# Patient Record
Sex: FEMALE | Race: WHITE | NOT HISPANIC OR LATINO | Employment: UNEMPLOYED | URBAN - METROPOLITAN AREA
[De-identification: names, ages, dates, MRNs, and addresses within clinical notes are randomized per-mention and may not be internally consistent; named-entity substitution may affect disease eponyms.]

---

## 2023-03-09 LAB
APPEARANCE, URINE: CLEAR
BACTERIA, URINE: ABNORMAL /HPF
BILIRUBIN, URINE: NEGATIVE
BLOOD, URINE: ABNORMAL
COLOR, URINE: ABNORMAL
GLUCOSE, URINE: NEGATIVE MG/DL
KETONES, URINE: ABNORMAL MG/DL
LEUKOCYTE ESTERASE, URINE: ABNORMAL
NITRITE, URINE: NEGATIVE
PH, URINE: 7 (ref 5–8)
PROTEIN, URINE: NEGATIVE MG/DL
RBC, URINE: <1 /HPF (ref 0–5)
SPECIFIC GRAVITY, URINE: 1.01 (ref 1–1.03)
SQUAMOUS EPITHELIAL CELLS, URINE: 3 /HPF
UROBILINOGEN, URINE: <2 MG/DL (ref 0–1.9)
WBC, URINE: 1 /HPF (ref 0–5)

## 2023-03-11 LAB — URINE CULTURE: NORMAL

## 2023-05-30 LAB
APPEARANCE, URINE: CLEAR
BILIRUBIN, URINE: NEGATIVE
BLOOD, URINE: NEGATIVE
COLOR, URINE: COLORLESS
GLUCOSE, URINE: NEGATIVE MG/DL
KETONES, URINE: NEGATIVE MG/DL
LEUKOCYTE ESTERASE, URINE: NEGATIVE
NITRITE, URINE: NEGATIVE
PH, URINE: 6 (ref 5–8)
PROTEIN, URINE: NEGATIVE MG/DL
SPECIFIC GRAVITY, URINE: 1.01 (ref 1–1.03)
UROBILINOGEN, URINE: <2 MG/DL (ref 0–1.9)

## 2023-05-31 LAB — URINE CULTURE: NORMAL

## 2023-11-03 ENCOUNTER — TELEMEDICINE (OUTPATIENT)
Dept: PRIMARY CARE | Facility: CLINIC | Age: 48
End: 2023-11-03
Payer: COMMERCIAL

## 2023-11-03 DIAGNOSIS — U07.1 COVID-19: Primary | ICD-10-CM

## 2023-11-03 PROCEDURE — 99213 OFFICE O/P EST LOW 20 MIN: CPT | Performed by: INTERNAL MEDICINE

## 2023-11-03 RX ORDER — NIRMATRELVIR AND RITONAVIR 300-100 MG
3 KIT ORAL 2 TIMES DAILY
Qty: 30 TABLET | Refills: 0 | Status: SHIPPED | OUTPATIENT
Start: 2023-11-03 | End: 2023-11-08

## 2023-11-03 ASSESSMENT — ENCOUNTER SYMPTOMS
FEVER: 1
SORE THROAT: 1
COUGH: 1
HEADACHES: 1

## 2023-11-03 NOTE — PROGRESS NOTES
Subjective   Patient ID: Orly Hernández is a 47 y.o. female who presents for No chief complaint on file..    Fever   This is a new problem. The current episode started yesterday. The problem occurs constantly. The problem has been rapidly worsening. The maximum temperature noted was 103 to 103.9 F. The temperature was taken using a tympanic thermometer. Associated symptoms include congestion, coughing, headaches, muscle aches and a sore throat.     Patient on with me on a virtual visit  Monday had a cold sore and heavy discharge of mucus, fever 103  Took flu stuff with ibuprofen   Covid +   Review of Systems   Constitutional:  Positive for fever.   HENT:  Positive for congestion and sore throat.    Respiratory:  Positive for cough.    Neurological:  Positive for headaches.     General: see hpi  Ears, Nose, Throat : see hpi  Dermatologic: Negative for new skin conditions, rash  Respiratory: see hpi  Cardiovascular: Negative for chest pain, palpitations, or leg swelling  Gastrointestinal: Negative for nausea/vomiting, abdominal pain, changes in bowel habits  Neurological: Negative for dizziness see hpi headaches    Previous history  Past Medical History:   Diagnosis Date    Abnormal weight gain 08/15/2022    Weight gain    Encounter for gynecological examination (general) (routine) without abnormal findings 08/31/2022    Visit for gynecologic examination    Encounter for other screening for malignant neoplasm of breast 08/16/2022    Breast screening    Encounter for screening for malignant neoplasm of colon 08/16/2022    Colon cancer screening    Hyperlipidemia, unspecified 08/16/2022    Hyperlipidemia    Nonscarring hair loss, unspecified 07/08/2022    Alopecia    Pain in right knee     Chronic pain of both knees    Sleep apnea, unspecified 07/08/2022    Sleep apnea    Sprain of anterior cruciate ligament of right knee, sequela     Rupture of anterior cruciate ligament of both knees, sequela    Unspecified fracture of  left wrist and hand, initial encounter for closed fracture     Hand fracture, left    Unspecified skin changes 11/02/2022    Skin change     Past Surgical History:   Procedure Laterality Date    KNEE SURGERY  01/19/2017    Knee Surgery    OTHER SURGICAL HISTORY  08/16/2022    Abdominal surgery        No family history on file.  Not on File  No current outpatient medications    Objective       Physical Exam  via [facility]  General: Well groomed, well nourished , speaks full sentences  Alert Cooperative , no apparent distress   Skin: Good color does not appear dehydrated   Eyes: Extra ocular muscle movements intact, anicteric sclerae  Neck: Supple, with good range of motion looking behind her and moving head sideways to cough   Neurological: Alert, oriented        Assessment/Plan   Orly Hernández is a 47 y.o. female who presents for the concerns below:    Problem List Items Addressed This Visit    None      COVID 19 INFECTION feeling sick , fever, cough, fatigue , myalgias . taking over the counter analgesics and cough preparations.  with immunocompromised condition , age , and has been diagnosed + within the past 5 days.  We will suggest starting Paxlovid (checked GFR from recent labwork) ., sent patient the information and as per protocol patient needs to read the FACT sheet . Isolation for 5 days , and on Day 6-10 may come out of isolation need to wear mask and avoid contact with immunosuppressed family/friends.  Handwashing, hydration, change toothbrush If symptoms are getting worse will need emergent evaluation.    Time Spent  with patient:  8  minutes of which greater than 50 percent was spent counselling and coordinating care.           Discussed with:   Return in : prn    Portions of this note were generated using digital voice recognition software, and may contain grammatical errors       Anthony Vaughn MD  11/03/23  2:35 PM

## 2023-11-06 ENCOUNTER — TELEPHONE (OUTPATIENT)
Dept: PRIMARY CARE | Facility: CLINIC | Age: 48
End: 2023-11-06
Payer: COMMERCIAL

## 2023-11-06 NOTE — TELEPHONE ENCOUNTER
The patient called and was diagnosed with COVID.  She wanted to know if she could get a letter for her school.  You did a visit with her on the 3rd and would like the letter to return on the  22nd.    Her phone number is [phone]    Please email to [email or web address]

## 2023-11-07 ENCOUNTER — TELEMEDICINE (OUTPATIENT)
Dept: PRIMARY CARE | Facility: CLINIC | Age: 48
End: 2023-11-07
Payer: COMMERCIAL

## 2023-11-07 DIAGNOSIS — U07.1 COVID-19: Primary | ICD-10-CM

## 2023-11-07 DIAGNOSIS — Z02.89 ENCOUNTER TO OBTAIN EXCUSE FROM SCHOOL: ICD-10-CM

## 2023-11-07 DIAGNOSIS — R35.1 NOCTURIA: ICD-10-CM

## 2023-11-07 DIAGNOSIS — G47.9 SLEEP DISTURBANCE: ICD-10-CM

## 2023-11-07 PROCEDURE — 99214 OFFICE O/P EST MOD 30 MIN: CPT | Performed by: INTERNAL MEDICINE

## 2023-11-07 RX ORDER — BENZONATATE 100 MG/1
100 CAPSULE ORAL 3 TIMES DAILY PRN
Qty: 30 CAPSULE | Refills: 0 | Status: SHIPPED | OUTPATIENT
Start: 2023-11-07 | End: 2023-11-17

## 2023-11-07 RX ORDER — ALBUTEROL SULFATE 90 UG/1
2 AEROSOL, METERED RESPIRATORY (INHALATION) EVERY 6 HOURS PRN
Qty: 18 G | Refills: 11 | Status: SHIPPED | OUTPATIENT
Start: 2023-11-07 | End: 2024-11-06

## 2023-11-07 NOTE — LETTER
November 7, 2023     Patient: Orly Hernández   YOB: 1975   Date of Visit: 11/7/2023       To Whom It May Concern:    Orly Hernández was seen virtually in my clinic on 11/7/2023 at 10:45 am. Please excuse Orly for her absence from school , she needs more time to recover from her current illness.  She may return to school on 11/22/2023.    If you have any questions or concerns, please don't hesitate to call.         Sincerely,         Anthony Vaughn MD  [facility]  [address] [address]   [phone]        CC: No Recipients

## 2023-11-07 NOTE — PROGRESS NOTES
Subjective   Patient ID: Orly Hernández is a 47 y.o. female who presents for No chief complaint on file..    HPI  Patient on with me on a virtual visit  Taking the paxlovid ,chest tight she is afraid if she runs out of it  She is taking tylenol   She had been inhaler in [place] and [place] due to [place] so she is familiar   Review of Systems  General: see hpi  Ears, Nose, Throat : see hpi  Dermatologic: Negative for new skin conditions, rash  Respiratory: see hpi  Cardiovascular: Negative for chest pain, palpitations, or leg swelling  Gastrointestinal: Negative for nausea/vomiting, abdominal pain, changes in bowel habits  Neurological: Negative for dizziness see hpi headaches    Previous history  Past Medical History:   Diagnosis Date    Abnormal weight gain 08/15/2022    Weight gain    Encounter for gynecological examination (general) (routine) without abnormal findings 08/31/2022    Visit for gynecologic examination    Encounter for other screening for malignant neoplasm of breast 08/16/2022    Breast screening    Encounter for screening for malignant neoplasm of colon 08/16/2022    Colon cancer screening    Hyperlipidemia, unspecified 08/16/2022    Hyperlipidemia    Nonscarring hair loss, unspecified 07/08/2022    Alopecia    Pain in right knee     Chronic pain of both knees    Sleep apnea, unspecified 07/08/2022    Sleep apnea    Sprain of anterior cruciate ligament of right knee, sequela     Rupture of anterior cruciate ligament of both knees, sequela    Unspecified fracture of left wrist and hand, initial encounter for closed fracture     Hand fracture, left    Unspecified skin changes 11/02/2022    Skin change     Past Surgical History:   Procedure Laterality Date    KNEE SURGERY  01/19/2017    Knee Surgery    OTHER SURGICAL HISTORY  08/16/2022    Abdominal surgery        No family history on file.  Allergies   Allergen Reactions    Sulfa (Sulfonamide Antibiotics) Rash     Current Outpatient Medications    Medication Instructions    nirmatrelvir-ritonavir (Paxlovid) 300 mg (150 mg x 2)-100 mg tablet therapy pack 3 tablets, oral, 2 times daily, Follow the instructions on the package       Objective       Physical Exam  via [facility]  General: Well groomed, well nourished , speaks full sentences  Alert Cooperative , looks worried   Skin: Good color does not appear dehydrated   Eyes: Extra ocular muscle movements intact, anicteric sclerae  Neck: Supple, with good range of motion looking behind her and moving head sideways to cough   Neurological: Alert, oriented            Assessment/Plan   Orly Hernández is a 47 y.o. female who presents for the concerns below:    Problem List Items Addressed This Visit    None  COVID19 INFECTION   Need to eat nutritious food, will send inhaler and benzonatate perles she is worried what would happen if she stops the paxlovid what would happen  Should sit upright more , wear mask   Continue water and pedialyte   NOCTURIA  limit liquid/ water intake 1-2 hours before bedtime and empty bladder right before getting into bed  ANXIETY with her usual health she should be able to recover some may be over 2-3 weeks   Sleep disturbance if she does have neck and / or back pain would be able to use her muscle relaxant which in turn might make her sleepy     Need excuse for school back on  11/22 sent to her email    She recounts court will be needing documents will have legal     Discussed with:   Return in :  Come in   Portions of this note were generated using digital voice recognition software, and may contain grammatical errors       Anthony Vaughn MD  11/07/23  11:20 AM

## 2023-11-29 ENCOUNTER — TELEPHONE (OUTPATIENT)
Dept: PRIMARY CARE | Facility: CLINIC | Age: 48
End: 2023-11-29
Payer: COMMERCIAL

## 2023-11-29 ENCOUNTER — TELEMEDICINE (OUTPATIENT)
Dept: PRIMARY CARE | Facility: CLINIC | Age: 48
End: 2023-11-29
Payer: COMMERCIAL

## 2023-11-29 DIAGNOSIS — J01.90 ACUTE SINUSITIS, RECURRENCE NOT SPECIFIED, UNSPECIFIED LOCATION: Primary | ICD-10-CM

## 2023-11-29 PROCEDURE — 99441 PR PHYS/QHP TELEPHONE EVALUATION 5-10 MIN: CPT | Performed by: INTERNAL MEDICINE

## 2023-11-29 RX ORDER — FLUTICASONE PROPIONATE 50 MCG
2 SPRAY, SUSPENSION (ML) NASAL DAILY
Qty: 16 G | Refills: 5 | Status: SHIPPED | OUTPATIENT
Start: 2023-11-29 | End: 2024-11-28

## 2023-11-29 NOTE — PROGRESS NOTES
Subjective   Patient ID: Orly Hernández is a 47 y.o. female who presents for No chief complaint on file..    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   Now with 3 exams next week a lot of pressure cannot defer, but needs letter sick and trying to evict tenants -stress    Review of Systems  General: Denies fever, chills, night sweats,  Ears, Nose, Throat :  Negative for hearing changes, sinus discomfort  Dermatologic: Negative for new skin conditions, rash  Respiratory: Negative for wheezing, shortness of breath, cough  Cardiovascular: Negative for chest pain, palpitations, or leg swelling  Gastrointestinal: Negative for nausea/vomiting, abdominal pain, changes in bowel habits  Genitourinary NEGATIVE FOR URINARY INCONTINENCE urgency , frequency, discomfort   Musculoskeletal: see hpi  Neurological: Negative for headaches, dizziness    Previous history  Past Medical History:   Diagnosis Date    Abnormal weight gain 08/15/2022    Weight gain    Encounter for gynecological examination (general) (routine) without abnormal findings 08/31/2022    Visit for gynecologic examination    Encounter for other screening for malignant neoplasm of breast 08/16/2022    Breast screening    Encounter for screening for malignant neoplasm of colon 08/16/2022    Colon cancer screening    Hyperlipidemia, unspecified 08/16/2022    Hyperlipidemia    Nonscarring hair loss, unspecified 07/08/2022    Alopecia    Pain in right knee     Chronic pain of both knees    Sleep apnea, unspecified 07/08/2022    Sleep apnea    Sprain of anterior cruciate ligament of right knee, sequela     Rupture of anterior cruciate ligament of both knees, sequela    Unspecified fracture of left wrist and hand, initial encounter for closed fracture     Hand fracture, left    Unspecified skin changes 11/02/2022     Skin change     Past Surgical History:   Procedure Laterality Date    KNEE SURGERY  01/19/2017    Knee Surgery    OTHER SURGICAL HISTORY  08/16/2022    Abdominal surgery        No family history on file.  Allergies   Allergen Reactions    Sulfa (Sulfonamide Antibiotics) Rash     Current Outpatient Medications   Medication Instructions    albuterol 90 mcg/actuation inhaler 2 puffs, inhalation, Every 6 hours PRN       Objective       Physical Exam  TELEMEDICINE EXAM:  General:  alert , voice sounds clear   no cough    no hoarseness , speaks in full sentences strong clear voice  Mood level voice , Neuro oriented to time , place, and person      Assessment/Plan   Orly Hernández is a 47 y.o. female who presents for the concerns below:    Problem List Items Addressed This Visit    None  Visit Diagnoses       Acute sinusitis, recurrence not specified, unspecified location    -  Primary          SINUSITIS PLAN: at this point likely viral Supportive care, plenty of fluids, Tylenol  OTC decongestants ,use prescribed steroid nasal spray, antibiotics if progressing will treat  appropriate to treat bacterial etiology, Amoxicillin , frequent handwashing, sanitize surrounding objects, change toothbrush. If any problems arise patient to call or come back in.    Defer two hours each for the hree exams accommodate and extend the time for completion to Dec 14       Discussed with:   Return in :    Portions of this note were generated using digital voice recognition software, and may contain grammatical errors       Anthony Vaughn MD  11/29/23  12:17 PM

## 2023-11-29 NOTE — TELEPHONE ENCOUNTER
Pt states she is under a lot of stress right now and is fighting a bad sinus infection.  She is wondering if you can squeeze her in for a virtual visit this afternoon?

## 2023-11-30 ENCOUNTER — APPOINTMENT (OUTPATIENT)
Dept: PRIMARY CARE | Facility: CLINIC | Age: 48
End: 2023-11-30
Payer: COMMERCIAL

## 2023-12-04 ENCOUNTER — TELEPHONE (OUTPATIENT)
Dept: PRIMARY CARE | Facility: CLINIC | Age: 48
End: 2023-12-04
Payer: COMMERCIAL

## 2023-12-04 NOTE — TELEPHONE ENCOUNTER
Patient called to follow up on paper work dropped off today.    Are you able to complete today?  Do you need a virtual appointment

## 2024-02-01 ENCOUNTER — TELEPHONE (OUTPATIENT)
Dept: PRIMARY CARE | Facility: CLINIC | Age: 49
End: 2024-02-01

## 2024-02-01 ENCOUNTER — OFFICE VISIT (OUTPATIENT)
Dept: PRIMARY CARE | Facility: CLINIC | Age: 49
End: 2024-02-01
Payer: COMMERCIAL

## 2024-02-01 VITALS
BODY MASS INDEX: 29.35 KG/M2 | SYSTOLIC BLOOD PRESSURE: 105 MMHG | WEIGHT: 187 LBS | HEIGHT: 67 IN | DIASTOLIC BLOOD PRESSURE: 70 MMHG

## 2024-02-01 DIAGNOSIS — F43.29 STRESS AND ADJUSTMENT REACTION: Primary | ICD-10-CM

## 2024-02-01 DIAGNOSIS — G47.9 SLEEP DISTURBANCE: ICD-10-CM

## 2024-02-01 DIAGNOSIS — G44.209 TENSION HEADACHE: ICD-10-CM

## 2024-02-01 DIAGNOSIS — Z00.00 ROUTINE GENERAL MEDICAL EXAMINATION AT A HEALTH CARE FACILITY: Primary | ICD-10-CM

## 2024-02-01 PROCEDURE — 1036F TOBACCO NON-USER: CPT | Performed by: INTERNAL MEDICINE

## 2024-02-01 PROCEDURE — 99214 OFFICE O/P EST MOD 30 MIN: CPT | Performed by: INTERNAL MEDICINE

## 2024-02-01 RX ORDER — CYCLOBENZAPRINE HCL 5 MG
5 TABLET ORAL NIGHTLY
COMMUNITY
Start: 2024-01-19 | End: 2024-02-28 | Stop reason: SDUPTHER

## 2024-02-01 ASSESSMENT — PATIENT HEALTH QUESTIONNAIRE - PHQ9
2. FEELING DOWN, DEPRESSED OR HOPELESS: NOT AT ALL
1. LITTLE INTEREST OR PLEASURE IN DOING THINGS: NOT AT ALL
SUM OF ALL RESPONSES TO PHQ9 QUESTIONS 1 AND 2: 0

## 2024-02-01 ASSESSMENT — ENCOUNTER SYMPTOMS
DEPRESSION: 1
OCCASIONAL FEELINGS OF UNSTEADINESS: 0
LOSS OF SENSATION IN FEET: 0

## 2024-02-01 ASSESSMENT — COLUMBIA-SUICIDE SEVERITY RATING SCALE - C-SSRS
6. HAVE YOU EVER DONE ANYTHING, STARTED TO DO ANYTHING, OR PREPARED TO DO ANYTHING TO END YOUR LIFE?: NO
1. IN THE PAST MONTH, HAVE YOU WISHED YOU WERE DEAD OR WISHED YOU COULD GO TO SLEEP AND NOT WAKE UP?: NO
2. HAVE YOU ACTUALLY HAD ANY THOUGHTS OF KILLING YOURSELF?: NO

## 2024-02-01 NOTE — LETTER
February 1, 2024     Patient: Orly Hernández   YOB: 1975   Date of Visit: 2/1/2024       To Whom It May Concern:    Orly Hernández was seen in my clinic on 2/1/2024 at 1:45 pm. Please excuse Orly for her absence from work on this day to make the appointment.    If you have any questions or concerns, please don't hesitate to call.         Sincerely,         Anthony Vaughn MD        CC: No Recipients

## 2024-02-01 NOTE — LETTER
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    Patient is going through extreme family circumstances.    if you have any questions please give us a call.  Thank you      Sincerely,    Anthony Vaughn MD  [place], [place]

## 2024-02-01 NOTE — LETTER
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    Patient is going through extreme family circumstances.    if you have any questions please give us a call.  Thank you      Sincerely,    Anthony Vaughn MD  [facility]  [place], [place]

## 2024-02-01 NOTE — PROGRESS NOTES
Subjective   Patient ID: Orly Hernández is a 48 y.o. female who presents for EPV and Weight Loss.    HPI  Patient in for a visit  Had lost weight , prior to that she gained 20+ lbs in fall   Now losing weight ,the rate is fast, under a lot of stress  The divorce will actually have a trial now    Review of Systems  General: Denies fever, chills, night sweats,  Eyes: Negative for recent visual changes  Ears, Nose, Throat :  Negative for hearing changes, sinus discomfort  Dermatologic: Negative for new skin conditions, rash  Respiratory: Negative for wheezing, shortness of breath, cough  Cardiovascular: Negative for chest pain, palpitations, or leg swelling  Gastrointestinal: Negative for nausea/vomiting, abdominal pain, changes in bowel habits  Genitourinary NEGATIVE FOR URINARY INCONTINENCE urgency , frequency, discomfort   Musculoskeletal: see hpi  Neurological: Negative for headaches, dizziness    Previous history  Past Medical History:   Diagnosis Date    Abnormal weight gain 08/15/2022    Weight gain    Encounter for gynecological examination (general) (routine) without abnormal findings 08/31/2022    Visit for gynecologic examination    Encounter for other screening for malignant neoplasm of breast 08/16/2022    Breast screening    Encounter for screening for malignant neoplasm of colon 08/16/2022    Colon cancer screening    Hyperlipidemia, unspecified 08/16/2022    Hyperlipidemia    Nonscarring hair loss, unspecified 07/08/2022    Alopecia    Pain in right knee     Chronic pain of both knees    Sleep apnea, unspecified 07/08/2022    Sleep apnea    Sprain of anterior cruciate ligament of right knee, sequela     Rupture of anterior cruciate ligament of both knees, sequela    Unspecified fracture of left wrist and hand, initial encounter for closed fracture     Hand fracture, left    Unspecified skin changes 11/02/2022    Skin change     Past Surgical History:   Procedure Laterality Date    KNEE SURGERY   01/19/2017    Knee Surgery    OTHER SURGICAL HISTORY  08/16/2022    Abdominal surgery     Social History     Tobacco Use    Smoking status: Never    Smokeless tobacco: Never   Vaping Use    Vaping Use: Never used   Substance Use Topics    Alcohol use: Not Currently    Drug use: Never     No family history on file.  Allergies   Allergen Reactions    Sulfa (Sulfonamide Antibiotics) Rash     Current Outpatient Medications   Medication Instructions    albuterol 90 mcg/actuation inhaler 2 puffs, inhalation, Every 6 hours PRN    cyclobenzaprine (FLEXERIL) 5 mg, oral, Nightly    fluticasone (Flonase) 50 mcg/actuation nasal spray 2 sprays, Each Nostril, Daily, Shake gently. Before first use, prime pump. After use, clean tip and replace cap.       Objective       Physical Exam  Vital Signs: as recorded above  General: Well groomed, well nourished   Orientation:  Alert , oriented to time, place , and person   Mood and Affect:  Cooperative , no apparent distress normal affect  Skin: Good color, good turgor  Eyes: Extra ocular muscle movements intact, anicteric sclerae  Neck: Supple, full range of movement  Chest: Normal breath sounds, normal chest wall exam, symmetric, good air entry, clear to auscultation  Heart: Regular rate and rhythm, without murmur, gallop, or rubs  Abdomen soft nontender no masses felt no hepatosplenomegaly, no rebound or guarding  BACK:  no CTLS spine tenderness, no flank tenderness  Extremities: full range of movement  bilateral UE and bilateral LE,  no lower extremity edema  Neurological: Alert, oriented, cranial nerves II-XII grossly intact except for visual acuity  Sensation:  Intact   Gait: normal steady        Assessment/Plan   Orly Hernández is a 48 y.o. female who presents for the concerns below:    Problem List Items Addressed This Visit    None    STRESS MANAGEMENT:PLAN:  Rest/sleep hygiene, continue  to get some exercise for stress relief  No thoughts of hurting self or others. Follow-up as  planned.    Sleep disturbance -  taking tyl PM , and prn cyclobenzaprine for her tension headaches     Abdominal pain - soft nontender on exam.  A lot of stress , need to make sure she has regular bowel  movements if bloating still occurs , start pepcid ac daily    Letter written for school / course to be deferred to a later date , not to coincide with her court dates        Discussed with:   Return in :  schedule complete physical end of the month    Portions of this note were generated using digital voice recognition software, and may contain grammatical errors       Anthony Vaughn MD  02/01/24  2:25 PM

## 2024-02-01 NOTE — TELEPHONE ENCOUNTER
Pt states you wrote a letter for her.    She has more information for you to add.     [facility] [place] ONLINE ID # [id number]    She is registered as Orly Hernández.  So if you could make her name saying this.     Please email the letter to:   [email or web address]

## 2024-02-28 ENCOUNTER — OFFICE VISIT (OUTPATIENT)
Dept: PRIMARY CARE | Facility: CLINIC | Age: 49
End: 2024-02-28
Payer: COMMERCIAL

## 2024-02-28 VITALS — SYSTOLIC BLOOD PRESSURE: 115 MMHG | WEIGHT: 190 LBS | DIASTOLIC BLOOD PRESSURE: 68 MMHG | BODY MASS INDEX: 29.76 KG/M2

## 2024-02-28 DIAGNOSIS — Z01.419 ENCOUNTER FOR GYNECOLOGICAL EXAMINATION: ICD-10-CM

## 2024-02-28 DIAGNOSIS — Z12.11 SCREENING FOR MALIGNANT NEOPLASM OF COLON: ICD-10-CM

## 2024-02-28 DIAGNOSIS — G44.209 TENSION HEADACHE: ICD-10-CM

## 2024-02-28 DIAGNOSIS — Z00.00 ROUTINE GENERAL MEDICAL EXAMINATION AT A HEALTH CARE FACILITY: Primary | ICD-10-CM

## 2024-02-28 DIAGNOSIS — F43.29 STRESS AND ADJUSTMENT REACTION: ICD-10-CM

## 2024-02-28 PROCEDURE — 1036F TOBACCO NON-USER: CPT | Performed by: INTERNAL MEDICINE

## 2024-02-28 PROCEDURE — 99396 PREV VISIT EST AGE 40-64: CPT | Performed by: INTERNAL MEDICINE

## 2024-02-28 RX ORDER — ALPRAZOLAM 0.5 MG/1
0.5 TABLET ORAL 3 TIMES DAILY PRN
Qty: 21 TABLET | Refills: 0 | Status: SHIPPED | OUTPATIENT
Start: 2024-02-28 | End: 2024-03-06

## 2024-02-28 RX ORDER — ALPRAZOLAM 0.5 MG/1
0.5 TABLET ORAL 3 TIMES DAILY PRN
Qty: 30 TABLET | Refills: 0 | Status: SHIPPED | OUTPATIENT
Start: 2024-02-28 | End: 2024-02-28 | Stop reason: SDUPTHER

## 2024-02-28 RX ORDER — CYCLOBENZAPRINE HCL 5 MG
5 TABLET ORAL NIGHTLY
Qty: 30 TABLET | Refills: 0 | Status: SHIPPED | OUTPATIENT
Start: 2024-02-28

## 2024-02-28 ASSESSMENT — COLUMBIA-SUICIDE SEVERITY RATING SCALE - C-SSRS
2. HAVE YOU ACTUALLY HAD ANY THOUGHTS OF KILLING YOURSELF?: NO
6. HAVE YOU EVER DONE ANYTHING, STARTED TO DO ANYTHING, OR PREPARED TO DO ANYTHING TO END YOUR LIFE?: NO
1. IN THE PAST MONTH, HAVE YOU WISHED YOU WERE DEAD OR WISHED YOU COULD GO TO SLEEP AND NOT WAKE UP?: NO

## 2024-02-28 ASSESSMENT — ENCOUNTER SYMPTOMS
LOSS OF SENSATION IN FEET: 0
OCCASIONAL FEELINGS OF UNSTEADINESS: 0
DEPRESSION: 0

## 2024-02-28 ASSESSMENT — PATIENT HEALTH QUESTIONNAIRE - PHQ9
1. LITTLE INTEREST OR PLEASURE IN DOING THINGS: NOT AT ALL
SUM OF ALL RESPONSES TO PHQ9 QUESTIONS 1 AND 2: 0
2. FEELING DOWN, DEPRESSED OR HOPELESS: NOT AT ALL

## 2024-02-28 NOTE — PROGRESS NOTES
Subjective   Patient ID: Orly Hernández is a 48 y.o. female who presents for Annual Exam.    HPI  Patient comes in for a physical exam last one done over a year ago , doing well over-all with no particular complaints. Also is in for laboratory review and health maintenance update.  Updating family history as well.  Interval event - past medical history, surgical, social, and family history reviewed and updated.  Interval care -  Patient is    up to date with dental care.  Patient does     receive routine vision care.    Review of Systems  General: Denies fever, chills, night sweats, changes in appetite or weight  ENT: Negative for ear pain, hearing loss, headache, difficulty swallowing, up to date with dental checks   Eyes: Negative for recent visual changes, up to date with eye exams  Dermatologic: Negative for new skin conditions, rash  Respiratory: Negative for paroxysmal nocturnal dyspnea, wheezing,shortness of breath, cough  Cardiovascular: Negative for chest pain, palpitations, or leg swelling  Gastrointestinal: Negative for nausea/vomiting, abdominal pain, changes in bowel habits  Genitourinary: Negative for dysuria, urgency, frequency  URINARY INCONTINENCE   Neurological: Negative for headaches, tremors, dizziness, memory loss, confusion, weakness, paresthesias  Psychiatric: Negative for sleep problems, anxiety, depression, conditions are stable  Endocrine: Negative for heat or cold intolerance, polyuria, polydipsia  Other:All systems have been reviewed and are negative except as previously noted.    Previous history  Past Medical History:   Diagnosis Date    Abnormal weight gain 08/15/2022    Weight gain    Encounter for gynecological examination (general) (routine) without abnormal findings 08/31/2022    Visit for gynecologic examination    Encounter for other screening for malignant neoplasm of breast 08/16/2022    Breast screening    Encounter for screening for malignant neoplasm of colon 08/16/2022     Colon cancer screening    Hyperlipidemia, unspecified 08/16/2022    Hyperlipidemia    Nonscarring hair loss, unspecified 07/08/2022    Alopecia    Pain in right knee     Chronic pain of both knees    Sleep apnea, unspecified 07/08/2022    Sleep apnea    Sprain of anterior cruciate ligament of right knee, sequela     Rupture of anterior cruciate ligament of both knees, sequela    Unspecified fracture of left wrist and hand, initial encounter for closed fracture     Hand fracture, left    Unspecified skin changes 11/02/2022    Skin change     Past Surgical History:   Procedure Laterality Date    KNEE SURGERY  01/19/2017    Knee Surgery    OTHER SURGICAL HISTORY  08/16/2022    Abdominal surgery     Social History     Tobacco Use    Smoking status: Never    Smokeless tobacco: Never   Vaping Use    Vaping Use: Never used   Substance Use Topics    Alcohol use: Not Currently    Drug use: Never     No family history on file.  Allergies   Allergen Reactions    Sulfa (Sulfonamide Antibiotics) Rash     Current Outpatient Medications   Medication Instructions    albuterol 90 mcg/actuation inhaler 2 puffs, inhalation, Every 6 hours PRN    cyclobenzaprine (FLEXERIL) 5 mg, oral, Nightly    fluticasone (Flonase) 50 mcg/actuation nasal spray 2 sprays, Each Nostril, Daily, Shake gently. Before first use, prime pump. After use, clean tip and replace cap.       Objective       Physical Exam  Vital Signs: as recorded above  General: Well groomed, well nourished   Orientation:  Alert , oriented to time, place , and person   Mood and Affect:  Cooperative , no apparent distress normal affect  Skin: Good color, good turgor  Eyes: Extra ocular muscle movements intact, anicteric sclerae  Neck: Supple, full range of movement  Chest: Normal breath sounds, normal chest wall exam, symmetric, good air entry, clear to auscultation  Heart: Regular rate and rhythm, without murmur, gallop, or rubs  Abdomen soft nontender no masses felt no  hepatosplenomegaly, no rebound or guarding  BACK:  no CTLS spine tenderness, no flank tenderness  Extremities: full range of movement  bilateral UE and bilateral LE,  no lower extremity edema  Neurological: Alert, oriented, cranial nerves II-XII intact except for visual acuity  Sensation:  Intact   Gait: normal steady      Assessment/Plan   Orly Hernández is a 48 y.o. female who presents for the concerns below:    Problem List Items Addressed This Visit    None  TENSION HEADACHE,  hydration, sleep, prn use of muscle relaxant not at same night as alprazolam   STRESS ,prn use of alprazolam not at the same time as cyclobenzaprine may be too sedating , she will not drive if she takes this, for anxiety  no history of addiction , understands possibility of habit formation . [place]AR[place] check done  no alcohol  (not applicable to patient )  or other medications or supplements that may increase adverse effects of this medication [place]ARRS web site checked and validated.  No suspicious activity was identified.    ASSESSMENT AND PLAN: Patient on examination is in fair health, will do screening blood tests to screen for high cholesterol, diabetes, thyroid,  Patient should be taking enough calcium in a balanced diet or supplements to total 1200 mg a day in divided doses unless with history of specific types of kidney stones. Vitamin D 800-1000 iu a day, check levels since  last lab work done showed slightly low levels.  . For Female Patients Only:PAP test yearly as per gynecology   Preventive Medicine: colon cancer screening by age 50 if no family history, balanced diet, and exercise as discussed. Seat belt use for injury prevention  Substance use and /or tobacco use not applicable / counseled when applicable. Immunizations TD  up to date.  Yearly flu vaccine unless contraindicated . More than 50% of office visit time spent counseling the patient, questions were answered. Complete physical examination in a year. Patient knows to call for  lab results in two weeks if they do not receive letter or call from our office.       Discussed with:   Return in :    Portions of this note were generated using digital voice recognition software, and may contain grammatical errors       Anthony Vaughn MD  02/28/24  3:34 PM

## 2024-02-28 NOTE — PATIENT INSTRUCTIONS
COLONOSCOPY SCHEDULING     [facility]                                            [phone]  [address] Dr Scott Valdovinos, DR Karley Nye , [place]                  [phone] Dr Ade Pedraza

## 2024-05-28 ENCOUNTER — APPOINTMENT (OUTPATIENT)
Dept: PRIMARY CARE | Facility: CLINIC | Age: 49
End: 2024-05-28
Payer: COMMERCIAL

## 2024-06-12 ENCOUNTER — APPOINTMENT (OUTPATIENT)
Dept: PRIMARY CARE | Facility: CLINIC | Age: 49
End: 2024-06-12
Payer: COMMERCIAL

## 2024-06-12 VITALS — DIASTOLIC BLOOD PRESSURE: 70 MMHG | BODY MASS INDEX: 31.58 KG/M2 | SYSTOLIC BLOOD PRESSURE: 112 MMHG | WEIGHT: 201.6 LBS

## 2024-06-12 DIAGNOSIS — F43.29 STRESS AND ADJUSTMENT REACTION: ICD-10-CM

## 2024-06-12 DIAGNOSIS — E66.9 MILD OBESITY: Primary | ICD-10-CM

## 2024-06-12 DIAGNOSIS — R79.9 ABNORMAL BLOOD CHEMISTRY: ICD-10-CM

## 2024-06-12 DIAGNOSIS — R09.81 SINUS CONGESTION: ICD-10-CM

## 2024-06-12 PROCEDURE — 1036F TOBACCO NON-USER: CPT | Performed by: INTERNAL MEDICINE

## 2024-06-12 PROCEDURE — 99214 OFFICE O/P EST MOD 30 MIN: CPT | Performed by: INTERNAL MEDICINE

## 2024-06-12 RX ORDER — VALACYCLOVIR HYDROCHLORIDE 500 MG/1
1 TABLET, FILM COATED ORAL
COMMUNITY
Start: 2024-05-03

## 2024-06-12 RX ORDER — VALACYCLOVIR HYDROCHLORIDE 1 G/1
1 TABLET, FILM COATED ORAL
COMMUNITY
Start: 2024-03-28

## 2024-06-12 RX ORDER — CIPROFLOXACIN HYDROCHLORIDE 3 MG/ML
1 SOLUTION/ DROPS OPHTHALMIC
COMMUNITY
Start: 2024-05-24

## 2024-06-12 RX ORDER — AZELASTINE 1 MG/ML
1 SPRAY, METERED NASAL 2 TIMES DAILY
Qty: 30 ML | Refills: 12 | Status: SHIPPED | OUTPATIENT
Start: 2024-06-12 | End: 2025-06-12

## 2024-06-12 RX ORDER — IBUPROFEN 600 MG/1
1 TABLET ORAL
COMMUNITY
Start: 2024-04-10

## 2024-06-12 ASSESSMENT — ENCOUNTER SYMPTOMS
OCCASIONAL FEELINGS OF UNSTEADINESS: 0
LOSS OF SENSATION IN FEET: 0
DEPRESSION: 0

## 2024-06-12 NOTE — PROGRESS NOTES
Subjective   Patient ID: Orly Hernández is a 48 y.o. female who presents for Follow-up, Sinusitis, and Weight Loss.    HPI  Patient in for a visit  3 weeks allergies got worse used neomed sinus rinse   Often clear now getting more yellow , did have f/c over the weekend  Fatigued using flonase taking claritin    Review of Systems  General: see hpi  Eyes: Negative for recent visual changes  Ears, Nose, Throat :  Negative for hearing changes  Dermatologic: Negative for new skin conditions, rash  Respiratory: Negative for wheezing, shortness of breath, cough  Cardiovascular: Negative for chest pain, palpitations, or leg swelling  Gastrointestinal: Negative for nausea/vomiting, abdominal pain, changes in bowel habits  Genitourinary Negative for Urinary Incontinence  urgency , frequency, discomfort   Musculoskeletal: see hpi  Neurological: Negative for headaches, dizziness    Previous history  Past Medical History:   Diagnosis Date    Abnormal weight gain 08/15/2022    Weight gain    Encounter for gynecological examination (general) (routine) without abnormal findings 08/31/2022    Visit for gynecologic examination    Encounter for other screening for malignant neoplasm of breast 08/16/2022    Breast screening    Encounter for screening for malignant neoplasm of colon 08/16/2022    Colon cancer screening    Hyperlipidemia, unspecified 08/16/2022    Hyperlipidemia    Nonscarring hair loss, unspecified 07/08/2022    Alopecia    Pain in right knee     Chronic pain of both knees    Sleep apnea, unspecified 07/08/2022    Sleep apnea    Sprain of anterior cruciate ligament of right knee, sequela     Rupture of anterior cruciate ligament of both knees, sequela    Unspecified fracture of left wrist and hand, initial encounter for closed fracture     Hand fracture, left    Unspecified skin changes 11/02/2022    Skin change     Past Surgical History:   Procedure Laterality Date    KNEE SURGERY  01/19/2017    Knee Surgery    OTHER  SURGICAL HISTORY  08/16/2022    Abdominal surgery     Social History     Tobacco Use    Smoking status: Never    Smokeless tobacco: Never   Vaping Use    Vaping status: Never Used   Substance Use Topics    Alcohol use: Not Currently    Drug use: Never     No family history on file.  Allergies   Allergen Reactions    Sulfa (Sulfonamide Antibiotics) Rash     Current Outpatient Medications   Medication Instructions    albuterol 90 mcg/actuation inhaler 2 puffs, inhalation, Every 6 hours PRN    ALPRAZolam (XANAX) 0.5 mg, oral, 3 times daily PRN    ciprofloxacin (Ciloxan) 0.3 % ophthalmic solution 1 drop, Both Eyes, Every 2 hours    cyclobenzaprine (FLEXERIL) 5 mg, oral, Nightly, Do not take on same night as alprazolam    fluticasone (Flonase) 50 mcg/actuation nasal spray 2 sprays, Each Nostril, Daily, Shake gently. Before first use, prime pump. After use, clean tip and replace cap.    ibuprofen 600 mg tablet 1 tablet, oral, 3 times daily (0900,1400,1900)    valACYclovir (Valtrex) 1 gram tablet 1 tablet, oral    valACYclovir (Valtrex) 500 mg tablet 1 tablet, oral, Daily (0630)       Objective       Physical Exam  Vital Signs: as recorded above  General: Well groomed, well nourished   Orientation:  Alert , oriented to time, place , and person   Mood and Affect:  Cooperative , no apparent distress normal affect  Skin: Good color, good turgor  Eyes: Extra ocular muscle movements intact, anicteric sclerae  Neck: Supple, full range of movement  Chest: Normal breath sounds, normal chest wall exam, symmetric, good air entry, clear to auscultation  Heart: Regular rate and rhythm, without murmur, gallop, or rubs  BACK:  no CTLS spine tenderness, no flank tenderness  Extremities: full range of movement  bilateral UE and bilateral LE,  no lower extremity edema  Neurological: Alert, oriented, cranial nerves II-XII grossly intact except for visual acuity  Sensation:  Intact   Gait: normal steady      Assessment/Plan   Orly Edgar is  a 48 y.o. female who presents for the concerns below:    Problem List Items Addressed This Visit    None    Sinus congestion still having sxs , continue flonase add azelastine   Switch to allegra from claritin can still continue tyl pm for her knee pain and sleep , if worse will call in antibiotics      Irregular periods , may be perimenopause she had seen Dr Nila Beltran gynecology in the past she is also doing pelvic floor exercise     WEIGHT GAIN stress levels high, need to cut back on portion and increase activity      Discussed with:   Return in : 1 month for a cpe the orders have been in I added hba1c since her last glucose was slightly high     Portions of this note were generated using digital voice recognition software, and may contain grammatical errors       Anthony Vaughn MD  06/12/24  9:35 AM

## 2024-06-12 NOTE — PATIENT INSTRUCTIONS
Schedule a complete physical with me and get labwork done 1-3 days before our physical  Get in with gynecology   Cut back on your calorie intake , increase your activity   Consider  for a couple of months to get you started   Please call our  [phone] to assist with scheduling     Add another nasal spray in addition to fluticasone , if secretions turn green and fevers let me know and I will call in amoxicillin

## 2024-07-16 ENCOUNTER — APPOINTMENT (OUTPATIENT)
Dept: PRIMARY CARE | Facility: CLINIC | Age: 49
End: 2024-07-16
Payer: COMMERCIAL

## 2024-07-16 VITALS — DIASTOLIC BLOOD PRESSURE: 60 MMHG | WEIGHT: 206.9 LBS | SYSTOLIC BLOOD PRESSURE: 126 MMHG | BODY MASS INDEX: 32.41 KG/M2

## 2024-07-16 DIAGNOSIS — G47.9 SLEEP DISTURBANCE: ICD-10-CM

## 2024-07-16 DIAGNOSIS — F43.29 STRESS AND ADJUSTMENT REACTION: Primary | ICD-10-CM

## 2024-07-16 PROCEDURE — 3008F BODY MASS INDEX DOCD: CPT | Performed by: INTERNAL MEDICINE

## 2024-07-16 PROCEDURE — 1036F TOBACCO NON-USER: CPT | Performed by: INTERNAL MEDICINE

## 2024-07-16 PROCEDURE — 99214 OFFICE O/P EST MOD 30 MIN: CPT | Performed by: INTERNAL MEDICINE

## 2024-07-16 ASSESSMENT — PATIENT HEALTH QUESTIONNAIRE - PHQ9
SUM OF ALL RESPONSES TO PHQ9 QUESTIONS 1 AND 2: 0
2. FEELING DOWN, DEPRESSED OR HOPELESS: NOT AT ALL
1. LITTLE INTEREST OR PLEASURE IN DOING THINGS: NOT AT ALL

## 2024-07-16 NOTE — PROGRESS NOTES
Subjective   Patient ID: Orly Hernández is a 48 y.o. female who presents for Follow-up.    HPI  Patient in for a visit  She has been better with using the nasal spray and getting more sleep  Has been exercising walking     Review of Systems  General: Denies fever, chills, night sweats,  Eyes: Negative for recent visual changes  Ears, Nose, Throat :  Negative for hearing changes, sinus discomfort  Dermatologic: Negative for new skin conditions, rash  Respiratory: Negative for wheezing, shortness of breath, cough  Cardiovascular: Negative for chest pain, palpitations, or leg swelling  Gastrointestinal: Negative for nausea/vomiting, abdominal pain, changes in bowel habits  Genitourinary Negative for Urinary Incontinence  urgency , frequency, discomfort   Musculoskeletal: see hpi  Neurological: Negative for headaches, dizziness    Previous history  Past Medical History:   Diagnosis Date    Abnormal weight gain 08/15/2022    Weight gain    Encounter for gynecological examination (general) (routine) without abnormal findings 08/31/2022    Visit for gynecologic examination    Encounter for other screening for malignant neoplasm of breast 08/16/2022    Breast screening    Encounter for screening for malignant neoplasm of colon 08/16/2022    Colon cancer screening    Hyperlipidemia, unspecified 08/16/2022    Hyperlipidemia    Nonscarring hair loss, unspecified 07/08/2022    Alopecia    Pain in right knee     Chronic pain of both knees    Sleep apnea, unspecified 07/08/2022    Sleep apnea    Sprain of anterior cruciate ligament of right knee, sequela     Rupture of anterior cruciate ligament of both knees, sequela    Unspecified fracture of left wrist and hand, initial encounter for closed fracture     Hand fracture, left    Unspecified skin changes 11/02/2022    Skin change     Past Surgical History:   Procedure Laterality Date    KNEE SURGERY  01/19/2017    Knee Surgery    OTHER SURGICAL HISTORY  08/16/2022    Abdominal  surgery     Social History     Tobacco Use    Smoking status: Never    Smokeless tobacco: Never   Vaping Use    Vaping status: Never Used   Substance Use Topics    Alcohol use: Not Currently    Drug use: Never     No family history on file.  Allergies   Allergen Reactions    Sulfa (Sulfonamide Antibiotics) Rash     Current Outpatient Medications   Medication Instructions    albuterol 90 mcg/actuation inhaler 2 puffs, inhalation, Every 6 hours PRN    ALPRAZolam (XANAX) 0.5 mg, oral, 3 times daily PRN    azelastine (Astelin) 137 mcg (0.1 %) nasal spray 1 spray, Each Nostril, 2 times daily, Use in each nostril as directed    ciprofloxacin (Ciloxan) 0.3 % ophthalmic solution 1 drop, Both Eyes, Every 2 hours    cyclobenzaprine (FLEXERIL) 5 mg, oral, Nightly, Do not take on same night as alprazolam    fluticasone (Flonase) 50 mcg/actuation nasal spray 2 sprays, Each Nostril, Daily, Shake gently. Before first use, prime pump. After use, clean tip and replace cap.    ibuprofen 600 mg tablet 1 tablet, oral, 3 times daily (0900,1400,1900)    valACYclovir (Valtrex) 1 gram tablet 1 tablet, oral    valACYclovir (Valtrex) 500 mg tablet 1 tablet, oral, Daily (0630)       Objective       Physical Exam  Vital Signs: as recorded above  General: Well groomed, well nourished   Orientation:  Alert , oriented to time, place , and person   Mood and Affect:  Cooperative , no apparent distress normal affect  Skin: Good color, good turgor  Eyes: Extra ocular muscle movements intact, anicteric sclerae  Neck: Supple, full range of movement  Chest: Normal breath sounds, normal chest wall exam, symmetric, good air entry, clear to auscultation  Heart: Regular rate and rhythm, without murmur, gallop, or rubs  BACK:  no CTLS spine tenderness, no flank tenderness  Extremities: full range of movement  bilateral UE and bilateral LE,  no lower extremity edema  Neurological: Alert, oriented, cranial nerves II-XII grossly intact except for visual  acuity  Sensation:  Intact   Gait: normal steady      Assessment/Plan   Orly Hernández is a 48 y.o. female who presents for the concerns below:    Problem List Items Addressed This Visit    None  Continue with exercise and sleep hygiene  which would help with weight control     RHINITIS PLAN:  Supportive care, plenty of fluids, can continue to use prescribed steroid nasal spray. Prn      Orders are in there   BMI 32 She is seeing endocrinologist in August  she will get the bloodwork in August continue exercise ,water intake sleep hygiene add stretching yoga or pilates        Discussed with:   Return in : 3-4 months will recheck labs before then    Portions of this note were generated using digital voice recognition software, and may contain grammatical errors       Anthony Vaughn MD  07/16/24  12:17 PM

## 2024-08-12 ENCOUNTER — LAB (OUTPATIENT)
Dept: LAB | Facility: LAB | Age: 49
End: 2024-08-12
Payer: COMMERCIAL

## 2024-08-12 ENCOUNTER — TELEPHONE (OUTPATIENT)
Dept: ENDOCRINOLOGY | Facility: CLINIC | Age: 49
End: 2024-08-12

## 2024-08-12 DIAGNOSIS — Z00.00 ROUTINE GENERAL MEDICAL EXAMINATION AT A HEALTH CARE FACILITY: ICD-10-CM

## 2024-08-12 DIAGNOSIS — R79.9 ABNORMAL BLOOD CHEMISTRY: ICD-10-CM

## 2024-08-12 LAB
ALBUMIN SERPL BCP-MCNC: 3.7 G/DL (ref 3.4–5)
ALP SERPL-CCNC: 47 U/L (ref 33–110)
ALT SERPL W P-5'-P-CCNC: 9 U/L (ref 7–45)
ANION GAP SERPL CALC-SCNC: 9 MMOL/L (ref 10–20)
APPEARANCE UR: ABNORMAL
AST SERPL W P-5'-P-CCNC: 11 U/L (ref 9–39)
BASOPHILS # BLD AUTO: 0.03 X10*3/UL (ref 0–0.1)
BASOPHILS NFR BLD AUTO: 0.7 %
BILIRUB SERPL-MCNC: 0.6 MG/DL (ref 0–1.2)
BILIRUB UR STRIP.AUTO-MCNC: NEGATIVE MG/DL
BUN SERPL-MCNC: 16 MG/DL (ref 6–23)
CALCIUM SERPL-MCNC: 9.2 MG/DL (ref 8.6–10.6)
CHLORIDE SERPL-SCNC: 106 MMOL/L (ref 98–107)
CHOLEST SERPL-MCNC: 215 MG/DL (ref 0–199)
CHOLESTEROL/HDL RATIO: 3.3
CO2 SERPL-SCNC: 29 MMOL/L (ref 21–32)
COLOR UR: ABNORMAL
CREAT SERPL-MCNC: 0.76 MG/DL (ref 0.5–1.05)
EGFRCR SERPLBLD CKD-EPI 2021: >90 ML/MIN/1.73M*2
EOSINOPHIL # BLD AUTO: 0.04 X10*3/UL (ref 0–0.7)
EOSINOPHIL NFR BLD AUTO: 1 %
ERYTHROCYTE [DISTWIDTH] IN BLOOD BY AUTOMATED COUNT: 11.6 % (ref 11.5–14.5)
EST. AVERAGE GLUCOSE BLD GHB EST-MCNC: 105 MG/DL
GLUCOSE SERPL-MCNC: 104 MG/DL (ref 74–99)
GLUCOSE UR STRIP.AUTO-MCNC: NORMAL MG/DL
HBA1C MFR BLD: 5.3 %
HCT VFR BLD AUTO: 36.2 % (ref 36–46)
HDLC SERPL-MCNC: 66.1 MG/DL
HGB BLD-MCNC: 11.6 G/DL (ref 12–16)
IMM GRANULOCYTES # BLD AUTO: 0.01 X10*3/UL (ref 0–0.7)
IMM GRANULOCYTES NFR BLD AUTO: 0.2 % (ref 0–0.9)
KETONES UR STRIP.AUTO-MCNC: NEGATIVE MG/DL
LDLC SERPL CALC-MCNC: 131 MG/DL
LEUKOCYTE ESTERASE UR QL STRIP.AUTO: ABNORMAL
LYMPHOCYTES # BLD AUTO: 1.81 X10*3/UL (ref 1.2–4.8)
LYMPHOCYTES NFR BLD AUTO: 44.3 %
MCH RBC QN AUTO: 31.7 PG (ref 26–34)
MCHC RBC AUTO-ENTMCNC: 32 G/DL (ref 32–36)
MCV RBC AUTO: 99 FL (ref 80–100)
MONOCYTES # BLD AUTO: 0.29 X10*3/UL (ref 0.1–1)
MONOCYTES NFR BLD AUTO: 7.1 %
MUCOUS THREADS #/AREA URNS AUTO: ABNORMAL /LPF
NEUTROPHILS # BLD AUTO: 1.91 X10*3/UL (ref 1.2–7.7)
NEUTROPHILS NFR BLD AUTO: 46.7 %
NITRITE UR QL STRIP.AUTO: NEGATIVE
NON HDL CHOLESTEROL: 149 MG/DL (ref 0–149)
NRBC BLD-RTO: 0 /100 WBCS (ref 0–0)
PH UR STRIP.AUTO: 7.5 [PH]
PLATELET # BLD AUTO: 171 X10*3/UL (ref 150–450)
POTASSIUM SERPL-SCNC: 4.2 MMOL/L (ref 3.5–5.3)
PROT SERPL-MCNC: 6.3 G/DL (ref 6.4–8.2)
PROT UR STRIP.AUTO-MCNC: ABNORMAL MG/DL
RBC # BLD AUTO: 3.66 X10*6/UL (ref 4–5.2)
RBC # UR STRIP.AUTO: ABNORMAL /UL
RBC #/AREA URNS AUTO: >20 /HPF
SODIUM SERPL-SCNC: 140 MMOL/L (ref 136–145)
SP GR UR STRIP.AUTO: 1.02
SQUAMOUS #/AREA URNS AUTO: ABNORMAL /HPF
TRIGL SERPL-MCNC: 91 MG/DL (ref 0–149)
TSH SERPL-ACNC: 1.51 MIU/L (ref 0.44–3.98)
UROBILINOGEN UR STRIP.AUTO-MCNC: NORMAL MG/DL
VLDL: 18 MG/DL (ref 0–40)
WBC # BLD AUTO: 4.1 X10*3/UL (ref 4.4–11.3)
WBC #/AREA URNS AUTO: ABNORMAL /HPF

## 2024-08-12 PROCEDURE — 85025 COMPLETE CBC W/AUTO DIFF WBC: CPT

## 2024-08-12 PROCEDURE — 84443 ASSAY THYROID STIM HORMONE: CPT

## 2024-08-12 PROCEDURE — 80061 LIPID PANEL: CPT

## 2024-08-12 PROCEDURE — 80053 COMPREHEN METABOLIC PANEL: CPT

## 2024-08-12 PROCEDURE — 36415 COLL VENOUS BLD VENIPUNCTURE: CPT

## 2024-08-12 PROCEDURE — 81001 URINALYSIS AUTO W/SCOPE: CPT

## 2024-08-12 PROCEDURE — 83036 HEMOGLOBIN GLYCOSYLATED A1C: CPT

## 2024-08-13 ENCOUNTER — APPOINTMENT (OUTPATIENT)
Dept: ENDOCRINOLOGY | Facility: CLINIC | Age: 49
End: 2024-08-13
Payer: COMMERCIAL

## 2024-08-13 VITALS
HEART RATE: 69 BPM | SYSTOLIC BLOOD PRESSURE: 92 MMHG | DIASTOLIC BLOOD PRESSURE: 60 MMHG | WEIGHT: 208 LBS | HEIGHT: 67 IN | BODY MASS INDEX: 32.65 KG/M2

## 2024-08-13 DIAGNOSIS — E66.9 MILD OBESITY: ICD-10-CM

## 2024-08-13 DIAGNOSIS — R79.9 ABNORMAL BLOOD CHEMISTRY: ICD-10-CM

## 2024-08-13 PROCEDURE — 1036F TOBACCO NON-USER: CPT | Performed by: NURSE PRACTITIONER

## 2024-08-13 PROCEDURE — 3008F BODY MASS INDEX DOCD: CPT | Performed by: NURSE PRACTITIONER

## 2024-08-13 PROCEDURE — 99202 OFFICE O/P NEW SF 15 MIN: CPT | Performed by: NURSE PRACTITIONER

## 2024-08-13 ASSESSMENT — PATIENT HEALTH QUESTIONNAIRE - PHQ9
2. FEELING DOWN, DEPRESSED OR HOPELESS: NOT AT ALL
SUM OF ALL RESPONSES TO PHQ9 QUESTIONS 1 & 2: 0
1. LITTLE INTEREST OR PLEASURE IN DOING THINGS: NOT AT ALL

## 2024-08-13 ASSESSMENT — PAIN SCALES - GENERAL: PAINLEVEL: 0-NO PAIN

## 2024-08-13 NOTE — PROGRESS NOTES
HPI   49 yo presents for metabolic management. Arrived late for her NP appointment. Pt with obesity, BMI 32.57. No significant medical history. Recent labs with PCP. Has worked with homeopathic/function medicine in the past. Having difficulty with losing weight and weight fluctuations.     -LABS: TSH 1.51  TRIGS 91 AST 11 ALT 9 ALKPHOS 47  -WEIGHT HISTORY before children 175-180  lowest weight 155 AVG WT 20s 160 30s 230s 40s 155-170  -MOST WEIGHT LOST 230 down to 155 pounds   -EXCERCISE cycle and swim weight training yoga   -DIET eats  3 meals a day,  Breakfast Lunch Dinner No Snacks avoids highly processed foods, eats organic   -Mainly drinks water throughout the day dairy allergy   -CHALLENGES   -CARB COUNTING/WW/NOOM    -SLEEP SCREENING Denies waking up with headaches, has been told she snores, has not been told stops breathing in sleep.   -MOOD stressful  -CUSHINGS SCREEN no characteristics present    Monthly periods, denies symptoms of perimenopause.      Current Outpatient Medications:     albuterol 90 mcg/actuation inhaler, Inhale 2 puffs every 6 hours if needed for wheezing., Disp: 18 g, Rfl: 11    azelastine (Astelin) 137 mcg (0.1 %) nasal spray, Administer 1 spray into each nostril 2 times a day. Use in each nostril as directed, Disp: 30 mL, Rfl: 12    ciprofloxacin (Ciloxan) 0.3 % ophthalmic solution, Administer 1 drop into both eyes every 2 hours., Disp: , Rfl:     cyclobenzaprine (Flexeril) 5 mg tablet, Take 1 tablet (5 mg) by mouth once daily at bedtime. Do not take on same night as alprazolam, Disp: 30 tablet, Rfl: 0    fluticasone (Flonase) 50 mcg/actuation nasal spray, Administer 2 sprays into each nostril once daily. Shake gently. Before first use, prime pump. After use, clean tip and replace cap., Disp: 16 g, Rfl: 5    ibuprofen 600 mg tablet, Take 1 tablet (600 mg) by mouth 3 times a day., Disp: , Rfl:     valACYclovir (Valtrex) 1 gram tablet, Take 1 tablet (1,000 mg) by mouth., Disp: ,  Rfl:     valACYclovir (Valtrex) 500 mg tablet, Take 1 tablet (500 mg) by mouth early in the morning.., Disp: , Rfl:     ALPRAZolam (Xanax) 0.5 mg tablet, Take 1 tablet (0.5 mg) by mouth 3 times a day as needed for anxiety for up to 7 days., Disp: 21 tablet, Rfl: 0      Allergies as of 08/13/2024 - Reviewed 08/13/2024   Allergen Reaction Noted    Sulfa (sulfonamide antibiotics) Rash 11/03/2023     Past Medical History:   Diagnosis Date    Abnormal weight gain 08/15/2022    Weight gain    Encounter for gynecological examination (general) (routine) without abnormal findings 08/31/2022    Visit for gynecologic examination    Encounter for other screening for malignant neoplasm of breast 08/16/2022    Breast screening    Encounter for screening for malignant neoplasm of colon 08/16/2022    Colon cancer screening    Hyperlipidemia, unspecified 08/16/2022    Hyperlipidemia    Nonscarring hair loss, unspecified 07/08/2022    Alopecia    Pain in right knee     Chronic pain of both knees    Sleep apnea, unspecified 07/08/2022    Sleep apnea    Sprain of anterior cruciate ligament of right knee, sequela     Rupture of anterior cruciate ligament of both knees, sequela    Unspecified fracture of left wrist and hand, initial encounter for closed fracture     Hand fracture, left    Unspecified skin changes 11/02/2022    Skin change      Past Surgical History:   Procedure Laterality Date    KNEE SURGERY  01/19/2017    Knee Surgery    OTHER SURGICAL HISTORY  08/16/2022    Abdominal surgery        Review of Systems   Cardiology: Lightheadedness-denies.  Chest pain-denies.  Leg edema-denies.  Palpitations-denies.  Respiratory: Cough-denies. Shortness of breath-denies.  Wheezing-denies.  Gastroenterology: Constipation-denies.  Diarrhea-denies.  Heartburn-denies.  Endocrinology: Cold intolerance-denies.  Heat intolerance-denies.  Sweats-denies.  Neurology: Headache-denies.  Tremor-denies.  Neuropathy in extremities-denies.  Psychology:  "Low energy-denies.  Irritability-denies.  Sleep disturbances-denies.      BP 92/60 (BP Location: Left arm, Patient Position: Sitting)   Pulse 69   Ht 1.702 m (5' 7.01\")   Wt 94.3 kg (208 lb)   BMI 32.57 kg/m²       Labs:  Lab Results   Component Value Date    WBC 4.1 (L) 08/12/2024    NRBC 0.0 08/12/2024    RBC 3.66 (L) 08/12/2024    HGB 11.6 (L) 08/12/2024    HCT 36.2 08/12/2024     08/12/2024     Lab Results   Component Value Date    CALCIUM 9.2 08/12/2024    AST 11 08/12/2024    ALKPHOS 47 08/12/2024    BILITOT 0.6 08/12/2024    PROT 6.3 (L) 08/12/2024    ALBUMIN 3.7 08/12/2024     08/12/2024    K 4.2 08/12/2024     08/12/2024    CO2 29 08/12/2024    ANIONGAP 9 (L) 08/12/2024    BUN 16 08/12/2024    CREATININE 0.76 08/12/2024    GLUCOSE 104 (H) 08/12/2024    ALT 9 08/12/2024    EGFR >90 08/12/2024     Lab Results   Component Value Date    CHOL 215 (H) 08/12/2024    TRIG 91 08/12/2024    HDL 66.1 08/12/2024    LDLCALC 131 (H) 08/12/2024       Lab Results   Component Value Date    TSH 1.51 08/12/2024       Lab Results   Component Value Date    HGBA1C 5.3 08/12/2024         Physical Exam   General Appearance: pleasant, cooperative, no acute distress  HEENT: no chemosis, no proptosis, no lid lag, no lid retraction    Assessment/Plan   1. Mild obesity  -will have her meet with educator  -given information about carbs and food choices  -labs reviewed with patient, no concerns from an endocrinology standpoint  -meets criteria for GLP1 medications but not covered by insurance     - Referral to Endocrinology    Follow Up: 4-6 weeks Beckie    -labs/tests/notes reviewed  -reviewed and counseled patient on medication monitoring and side effects     "

## 2024-09-01 ENCOUNTER — HOSPITAL ENCOUNTER (EMERGENCY)
Facility: HOSPITAL | Age: 49
Discharge: HOME | End: 2024-09-01
Payer: COMMERCIAL

## 2024-09-01 VITALS
RESPIRATION RATE: 16 BRPM | TEMPERATURE: 98.6 F | OXYGEN SATURATION: 99 % | BODY MASS INDEX: 30.31 KG/M2 | DIASTOLIC BLOOD PRESSURE: 83 MMHG | SYSTOLIC BLOOD PRESSURE: 116 MMHG | HEIGHT: 68 IN | HEART RATE: 64 BPM | WEIGHT: 200 LBS

## 2024-09-01 DIAGNOSIS — J06.9 VIRAL URI: Primary | ICD-10-CM

## 2024-09-01 LAB
FLUAV RNA RESP QL NAA+PROBE: NOT DETECTED
FLUBV RNA RESP QL NAA+PROBE: NOT DETECTED
S PYO DNA THROAT QL NAA+PROBE: NOT DETECTED
SARS-COV-2 RNA RESP QL NAA+PROBE: NOT DETECTED

## 2024-09-01 PROCEDURE — 87636 SARSCOV2 & INF A&B AMP PRB: CPT | Performed by: NURSE PRACTITIONER

## 2024-09-01 PROCEDURE — 87651 STREP A DNA AMP PROBE: CPT | Performed by: NURSE PRACTITIONER

## 2024-09-01 PROCEDURE — 99283 EMERGENCY DEPT VISIT LOW MDM: CPT

## 2024-09-01 RX ORDER — BENZONATATE 100 MG/1
100 CAPSULE ORAL EVERY 8 HOURS
Qty: 21 CAPSULE | Refills: 0 | Status: SHIPPED | OUTPATIENT
Start: 2024-09-01 | End: 2024-09-08

## 2024-09-01 RX ORDER — FLUTICASONE PROPIONATE 50 MCG
2 SPRAY, SUSPENSION (ML) NASAL DAILY
Qty: 16 G | Refills: 0 | Status: SHIPPED | OUTPATIENT
Start: 2024-09-01 | End: 2024-10-01

## 2024-09-01 ASSESSMENT — COLUMBIA-SUICIDE SEVERITY RATING SCALE - C-SSRS
1. IN THE PAST MONTH, HAVE YOU WISHED YOU WERE DEAD OR WISHED YOU COULD GO TO SLEEP AND NOT WAKE UP?: NO
2. HAVE YOU ACTUALLY HAD ANY THOUGHTS OF KILLING YOURSELF?: NO
6. HAVE YOU EVER DONE ANYTHING, STARTED TO DO ANYTHING, OR PREPARED TO DO ANYTHING TO END YOUR LIFE?: NO

## 2024-09-01 ASSESSMENT — PAIN DESCRIPTION - LOCATION: LOCATION: THROAT

## 2024-09-01 ASSESSMENT — PAIN - FUNCTIONAL ASSESSMENT: PAIN_FUNCTIONAL_ASSESSMENT: 0-10

## 2024-09-01 ASSESSMENT — PAIN SCALES - GENERAL: PAINLEVEL_OUTOF10: 5 - MODERATE PAIN

## 2024-09-01 NOTE — ED PROVIDER NOTES
HPI   No chief complaint on file.        History provided by:  Patient   used: No      48-year-old female presents for evaluation of possible strep throat.  Patient states that her child at home has strep.  She is endorsing sore throat, fatigue, headache and intermittent abdominal pain onset last evening.  Has taken over-the-counter medications with improvement but not relief of symptoms.  Denies any chest pain, shortness of breath, dysphagia, nausea, vomiting, diarrhea, urinary symptoms or voice changes.  Denies any additional symptoms or complaints this time.    ROS is otherwise negative unless stated above.      Patient History   Past Medical History:   Diagnosis Date    Abnormal weight gain 08/15/2022    Weight gain    Encounter for gynecological examination (general) (routine) without abnormal findings 08/31/2022    Visit for gynecologic examination    Encounter for other screening for malignant neoplasm of breast 08/16/2022    Breast screening    Encounter for screening for malignant neoplasm of colon 08/16/2022    Colon cancer screening    Hyperlipidemia, unspecified 08/16/2022    Hyperlipidemia    Nonscarring hair loss, unspecified 07/08/2022    Alopecia    Pain in right knee     Chronic pain of both knees    Sleep apnea, unspecified 07/08/2022    Sleep apnea    Sprain of anterior cruciate ligament of right knee, sequela     Rupture of anterior cruciate ligament of both knees, sequela    Unspecified fracture of left wrist and hand, initial encounter for closed fracture     Hand fracture, left    Unspecified skin changes 11/02/2022    Skin change     Past Surgical History:   Procedure Laterality Date    KNEE SURGERY  01/19/2017    Knee Surgery    OTHER SURGICAL HISTORY  08/16/2022    Abdominal surgery     No family history on file.  Social History     Tobacco Use    Smoking status: Never    Smokeless tobacco: Never   Vaping Use    Vaping status: Never Used   Substance Use Topics    Alcohol  use: Not Currently    Drug use: Never       Physical Exam   ED Triage Vitals   Temp Pulse Resp BP   -- -- -- --      SpO2 Temp src Heart Rate Source Patient Position   -- -- -- --      BP Location FiO2 (%)     -- --       Physical Exam  VS: As documented in the triage note and EMR flowsheet from this visit were reviewed.    GEN: NAD, nontoxic, well-appearing, ambulates without difficulty  EYES:  EOMs grossly intact, anicteric sclera, no nystagmus noted, clear and equal bilaterally, no foreign body noted  HEENT: Airway patent, ears with clear tympanic membranes bilaterally. Nasal mucosa clear. Mouth with normal mucosa.  No area of abscess or fluctuance noted.  No drainage noted. Throat has no vesicles, no oropharyngeal exudates and uvula is midline.  Mild posterior oropharyngeal erythema noted.  Face with no lymph node enlargement. No trismus or drooling noted.  Handling secretions.  Speech clear.  CARD: RRR, nontender chest, no crepitus deformities, no JVD, no murmurs rubs or gallops ; No edema noted.  Positive pulses bilaterally throughout.  Capillary refill less than 3 seconds.  No abnormal redness, warmth, tenderness or swelling noted to bilateral lower extremities.  PULMONARY: Clear all lung fields. Moving air well, Nonlabored, no accessory muscle use, able to speak complete sentences  ABDOMEN: Abdomen soft, non-distended, no rebound, no guarding. Bowel sounds normal in all 4 quadrants. No tenderness to palpation.  : deferred  MUSK: Spine appears normal, range of motion is not limited, no muscle or joint tenderness. Strength 5 out of 5 equal bilaterally throughout.  No step-offs, deformities or additional signs of trauma noted.  No spinal/midline tenderness to palpation  SKIN: Skin normal color for race, warm, dry and intact. No evidence of trauma. No rash noted.  NEURO: Alert and oriented x 3, speech is clear, no obvious deficits noted. No facial droop noted.    LYMPH: No adenopathy or splenomegaly. No  cervical, supraclavicular or inguinal lymphadenopathy.    ED Course & MDM   Diagnoses as of 09/01/24 2028   Viral URI                 No data recorded                                 Medical Decision Making    upon assessment patient was a healthy non-toxic appearing female in no apparent distress.  patient is ambulating independently in the ED without difficulty or dyspnea.  Airway intact. No stridor, trismus or drooling noted. Mild erythema noted to the throat without exudate, uvula midline.  No abdominal tenderness to palpation, benign and reassuring abdominal examination.  See physical exam section for my assessment.  Physical exam concerning for but not limited to strep throat vs viral pharyngitis vs mono vs URI. Due to prior history and current physical exam, I ordered a strep swab which was negative.  Due to this negative result, patient did request influenza/COVID swab which were obtained and negative.      I was not concerned for any peritonsillar abscess or additional emergency etiology as a cause of symptoms due to their reassuring assessment.  Patient declined need for medications while in the emergency department.  Patient was able to tolerate by mouth without difficulty.  Patient was educated on signs and symptoms of peritonsillar abscess.  Patient remained hemodynamically stable throughout ED visit.  Previous consult/ED visit notes were reviewed.  Findings were discussed with patient and patient agreeable for plan to discharge home with primary care provider follow up in one week for further management and to continue Tylenol by mouth and ibuprofen by mouth at home as needed for pain or fever.  No indication for antibiotics at this time.  Prior prescriptions reviewed and patient prescribed Flonase nasal spray and Tessalon Perles p.o. for symptomatic management at home.  Educated to maintain proper rest, hydration and social distancing. Educated to change tooth brush.  Return precautions discussed and  patient acknowledged understanding.  All questions and concerns answered prior to discharge.           *Please note that portions of this note may have been completed with a voice recognition program.  Efforts were made to edit the dictations but occasionally, words are mis-transcribed.    Procedure  Procedures     Mile Dixon, NORBERT-GINA  09/01/24 2028

## 2024-09-01 NOTE — ED TRIAGE NOTES
PT is A/Ox4 coming in for sore throat/headache and ABD pain. PT was possibly exposed to strep. No other complaints at this time vitals are with in normal limits.

## 2024-09-17 ENCOUNTER — APPOINTMENT (OUTPATIENT)
Dept: PRIMARY CARE | Facility: CLINIC | Age: 49
End: 2024-09-17
Payer: COMMERCIAL

## 2024-09-17 VITALS — WEIGHT: 202 LBS | SYSTOLIC BLOOD PRESSURE: 108 MMHG | BODY MASS INDEX: 30.71 KG/M2 | DIASTOLIC BLOOD PRESSURE: 62 MMHG

## 2024-09-17 DIAGNOSIS — Z23 NEED FOR SHINGLES VACCINE: Primary | ICD-10-CM

## 2024-09-17 PROCEDURE — 90471 IMMUNIZATION ADMIN: CPT | Performed by: INTERNAL MEDICINE

## 2024-09-17 PROCEDURE — 99214 OFFICE O/P EST MOD 30 MIN: CPT | Performed by: INTERNAL MEDICINE

## 2024-09-17 PROCEDURE — 1036F TOBACCO NON-USER: CPT | Performed by: INTERNAL MEDICINE

## 2024-09-17 PROCEDURE — 90750 HZV VACC RECOMBINANT IM: CPT | Performed by: INTERNAL MEDICINE

## 2024-09-17 ASSESSMENT — PATIENT HEALTH QUESTIONNAIRE - PHQ9
SUM OF ALL RESPONSES TO PHQ9 QUESTIONS 1 AND 2: 0
1. LITTLE INTEREST OR PLEASURE IN DOING THINGS: NOT AT ALL
2. FEELING DOWN, DEPRESSED OR HOPELESS: NOT AT ALL

## 2024-09-17 NOTE — PROGRESS NOTES
Subjective   Patient ID: Orly Hernández is a 48 y.o. female who presents for Follow-up (2 month fuv).    HPI  Patient in for a visit  She is working out hard, eating well avoiding gluten  Had seen endocrinology told she was fine , cousin on thyroid medication   Swim , bicycle , lift weights ,hike    Gynecology is evaluating a lesion     Review of Systems  General: Denies fever, chills, night sweats,  Eyes: Negative for recent visual changes  Ears, Nose, Throat :  Negative for hearing changes, sinus discomfort  Dermatologic: Negative for new skin conditions, rash  Respiratory: Negative for wheezing, shortness of breath, cough  Cardiovascular: Negative for chest pain, palpitations, or leg swelling  Gastrointestinal: Negative for nausea/vomiting, abdominal pain, changes in bowel habits  Genitourinary Negative for Urinary Incontinence  urgency , frequency, discomfort   Musculoskeletal: see hpi  Neurological: Negative for headaches, dizziness    Previous history  Past Medical History:   Diagnosis Date    Abnormal weight gain 08/15/2022    Weight gain    Encounter for gynecological examination (general) (routine) without abnormal findings 08/31/2022    Visit for gynecologic examination    Encounter for other screening for malignant neoplasm of breast 08/16/2022    Breast screening    Encounter for screening for malignant neoplasm of colon 08/16/2022    Colon cancer screening    Hyperlipidemia, unspecified 08/16/2022    Hyperlipidemia    Nonscarring hair loss, unspecified 07/08/2022    Alopecia    Pain in right knee     Chronic pain of both knees    Sleep apnea, unspecified 07/08/2022    Sleep apnea    Sprain of anterior cruciate ligament of right knee, sequela     Rupture of anterior cruciate ligament of both knees, sequela    Unspecified fracture of left wrist and hand, initial encounter for closed fracture     Hand fracture, left    Unspecified skin changes 11/02/2022    Skin change     Past Surgical History:   Procedure  Laterality Date    KNEE SURGERY  01/19/2017    Knee Surgery    OTHER SURGICAL HISTORY  08/16/2022    Abdominal surgery     Social History     Tobacco Use    Smoking status: Never    Smokeless tobacco: Never   Vaping Use    Vaping status: Never Used   Substance Use Topics    Alcohol use: Not Currently    Drug use: Never     No family history on file.  Allergies   Allergen Reactions    Sulfa (Sulfonamide Antibiotics) Rash     Current Outpatient Medications   Medication Instructions    albuterol 90 mcg/actuation inhaler 2 puffs, inhalation, Every 6 hours PRN    ALPRAZolam (XANAX) 0.5 mg, oral, 3 times daily PRN    azelastine (Astelin) 137 mcg (0.1 %) nasal spray 1 spray, Each Nostril, 2 times daily, Use in each nostril as directed    ciprofloxacin (Ciloxan) 0.3 % ophthalmic solution 1 drop, Both Eyes, Every 2 hours    cyclobenzaprine (FLEXERIL) 5 mg, oral, Nightly, Do not take on same night as alprazolam    fluticasone (Flonase) 50 mcg/actuation nasal spray 2 sprays, Each Nostril, Daily, Shake gently. Before first use, prime pump. After use, clean tip and replace cap.    ibuprofen 600 mg tablet 1 tablet, oral, 3 times daily (0900,1400,1900)    valACYclovir (Valtrex) 1 gram tablet 1 tablet, oral    valACYclovir (Valtrex) 500 mg tablet 1 tablet, oral, Daily (0630)       Objective       Physical Exam  Vital Signs: as recorded above  General: Well groomed, well nourished   Orientation:  Alert , oriented to time, place , and person   Mood and Affect:  Cooperative , no apparent distress normal affect  Skin: Good color, good turgor  Eyes: Extra ocular muscle movements intact, anicteric sclerae  Neck: Supple, full range of movement  Chest: Normal breath sounds, normal chest wall exam, symmetric, good air entry, clear to auscultation  Heart: Regular rate and rhythm, without murmur, gallop, or rubs  BACK:  no CTLS spine tenderness, no flank tenderness  Extremities: full range of movement  bilateral UE and bilateral LE,  no lower  extremity edema  Neurological: Alert, oriented, cranial nerves II-XII grossly intact except for visual acuity  Sensation:  Intact   Gait: normal steady      Assessment/Plan   Orly Hernández is a 48 y.o. female who presents for the concerns below:    Problem List Items Addressed This Visit    None    Bicycle outside go fast , in winter can do spinning  Lifts some weights  , eats a lot of protein few carbs a lot of vegetables organic , sleep better  Continue yoga for stress relief   She has lost 80 lbs in the past in [place] had detox   Followed the dr nichols diet doing those prinicples now wonderign if there is something that would kickstart it     HYPERCHOLESTEROLEMIA . Elevated LDL PLAN:  elevated not on medications  has come down with diet and exercise  Follow low cholesterol diet, encouraged high omega 3 fatty acid intake in diet, exercise, weight control. . Will Obtain AST/ALT, fasting lipid profile if not done within past 3-6 months . Coenzyme Q10 200 mg a day if able to help increase HDL.    Gynecology being seen will do more tests urogynecology     Discussed with:   Return in : 2 months recheck how meds are working for weight loss     Portions of this note were generated using digital voice recognition software, and may contain grammatical errors       Anthony Vaughn MD  09/17/24  12:58 PM

## 2024-09-17 NOTE — PATIENT INSTRUCTIONS
Check if contrave or qsymia is covered by your insurance     Look up the leptin diet , try to get 8 hours of sleep straight uninterrupted     Consider  for a couple of months     COLONOSCOPY SCHEDULING   [facility]                                            [phone]  #[address] Dr Scott Valdovinos, Dr Shan Sanchez, [address], [place], [place]   and [facility]               [phone] Dr Ade Pedraza

## 2024-09-25 NOTE — PROGRESS NOTES
HPI   49 yo presents for weight gain follow up, new patient with Evie GINA last month.  Pt with obesity, BMI 32.57. No significant medical history. Recent labs with PCP. Has worked with homeopathic/function medicine in the past. Having difficulty with losing weight and weight fluctuations.     -LABS: TSH 1.51  TRIGS 91 AST 11 ALT 9 ALKPHOS 47    -WEIGHT HISTORY before children 175-180  lowest weight 155 AVG WT 20s 160 30s 230s 40s 155-170  -MOST WEIGHT LOST 230 down to 155 pounds     -EXCERCISE cycle and swim weight training yoga; daily 60 minutes most daily  -Weight watchers in the past - did not like point system  -DIET eats 3 meals a day,  Breakfast Lunch Dinner, occasional snack, avoids highly processed foods, eats organic, gluten free, dairy free  -No sugar sweetened beverages    -SLEEP SCREENING Denies waking up with headaches, has been told she snores, has not been told stops breathing in sleep.   -MOOD stressful  -CUSHINGS SCREEN no characteristics present    Monthly periods, denies symptoms of perimenopause.        Current Outpatient Medications:     albuterol 90 mcg/actuation inhaler, Inhale 2 puffs every 6 hours if needed for wheezing., Disp: 18 g, Rfl: 11    azelastine (Astelin) 137 mcg (0.1 %) nasal spray, Administer 1 spray into each nostril 2 times a day. Use in each nostril as directed, Disp: 30 mL, Rfl: 12    ciprofloxacin (Ciloxan) 0.3 % ophthalmic solution, Administer 1 drop into both eyes every 2 hours., Disp: , Rfl:     cyclobenzaprine (Flexeril) 5 mg tablet, Take 1 tablet (5 mg) by mouth once daily at bedtime. Do not take on same night as alprazolam, Disp: 30 tablet, Rfl: 0    fluticasone (Flonase) 50 mcg/actuation nasal spray, Administer 2 sprays into each nostril once daily. Shake gently. Before first use, prime pump. After use, clean tip and replace cap., Disp: 16 g, Rfl: 0    ibuprofen 600 mg tablet, Take 1 tablet (600 mg) by mouth 3 times a day., Disp: , Rfl:     valACYclovir  "(Valtrex) 1 gram tablet, Take 1 tablet (1,000 mg) by mouth., Disp: , Rfl:     valACYclovir (Valtrex) 500 mg tablet, Take 1 tablet (500 mg) by mouth early in the morning.., Disp: , Rfl:     ALPRAZolam (Xanax) 0.5 mg tablet, Take 1 tablet (0.5 mg) by mouth 3 times a day as needed for anxiety for up to 7 days., Disp: 21 tablet, Rfl: 0    Allergies as of 09/30/2024 - Reviewed 09/30/2024   Allergen Reaction Noted    Sulfa (sulfonamide antibiotics) Rash 11/03/2023       /73 (BP Location: Right arm, Patient Position: Sitting)   Pulse 63   Wt 94.1 kg (207 lb 6.4 oz)   BMI 31.54 kg/m²     Labs:   Lab Results   Component Value Date    WBC 4.1 (L) 08/12/2024    NRBC 0.0 08/12/2024    RBC 3.66 (L) 08/12/2024    HGB 11.6 (L) 08/12/2024    HCT 36.2 08/12/2024     08/12/2024     Lab Results   Component Value Date    CALCIUM 9.2 08/12/2024    AST 11 08/12/2024    ALKPHOS 47 08/12/2024    BILITOT 0.6 08/12/2024    PROT 6.3 (L) 08/12/2024    ALBUMIN 3.7 08/12/2024     08/12/2024    K 4.2 08/12/2024     08/12/2024    CO2 29 08/12/2024    ANIONGAP 9 (L) 08/12/2024    BUN 16 08/12/2024    CREATININE 0.76 08/12/2024    GLUCOSE 104 (H) 08/12/2024    ALT 9 08/12/2024    EGFR >90 08/12/2024     Lab Results   Component Value Date    CHOL 215 (H) 08/12/2024    TRIG 91 08/12/2024    HDL 66.1 08/12/2024    LDLCALC 131 (H) 08/12/2024     No results found for: \"MICROALBCREA\"  Lab Results   Component Value Date    TSH 1.51 08/12/2024     No results found for: \"[id number]\"  Lab Results   Component Value Date    HGBA1C 5.3 08/12/2024       Assessment/Plan   1. Weight gain  -labs reviewed    -reviewed CHO foods, meal planning, reinforced carb limits; would benefit from tracking daily intake with My Fitness Pal luiza, reinforced mindful eating  -also reviewed calorie lucy luiza and [email or web address] to check carbs/calories of restaurant meals  -continue daily exercise, continue meditation for stress reduction  -reviewed GLP med options, " pt to check coverage and consider      Follow up: as needed    -labs/tests/notes reviewed  -reviewed and counseled patient on medication monitoring and side effects    Treatment and plan discussed with Dr. Coreas. Placido RN Certified Diabetes [facility] and

## 2024-09-26 ENCOUNTER — APPOINTMENT (OUTPATIENT)
Dept: OBSTETRICS AND GYNECOLOGY | Facility: CLINIC | Age: 49
End: 2024-09-26
Payer: COMMERCIAL

## 2024-09-27 ENCOUNTER — APPOINTMENT (OUTPATIENT)
Dept: OBSTETRICS AND GYNECOLOGY | Facility: CLINIC | Age: 49
End: 2024-09-27
Payer: COMMERCIAL

## 2024-09-30 ENCOUNTER — APPOINTMENT (OUTPATIENT)
Dept: ENDOCRINOLOGY | Facility: CLINIC | Age: 49
End: 2024-09-30
Payer: COMMERCIAL

## 2024-09-30 VITALS
SYSTOLIC BLOOD PRESSURE: 103 MMHG | DIASTOLIC BLOOD PRESSURE: 73 MMHG | HEART RATE: 63 BPM | WEIGHT: 207.4 LBS | BODY MASS INDEX: 31.54 KG/M2

## 2024-09-30 DIAGNOSIS — R63.5 WEIGHT GAIN: ICD-10-CM

## 2024-09-30 PROCEDURE — 99213 OFFICE O/P EST LOW 20 MIN: CPT | Performed by: INTERNAL MEDICINE

## 2024-09-30 ASSESSMENT — PAIN SCALES - GENERAL: PAINLEVEL: 0-NO PAIN

## 2024-09-30 NOTE — PATIENT INSTRUCTIONS
0-30 grams of carbs per meal  0-15 grams of carbs for snacks  Protein with all meals/snacks    My fitness pal to track calories/carbs    Calorie lucy luiza    [employer] to look up restaurant carbs  [employer]     Check coverage for weight loss medications:  Weekly injectable medications  Wegovy   Zepbound

## 2024-09-30 NOTE — PROGRESS NOTES
Attestation signed by Allen Coreas MD on 9/30/24 at 11:15 AM.    I, Dr Allen Coreas, have reviewed this progress note, medication list, vital signs, any pertinent lab values, and any CGM data if present with the Certified Diabetes Care and  face to face during this visit today. This note reflects the treatment plan that was made under my direction after reviewing the above mentioned elements while face to face with the patient and CDE.  I personally answered and addressed any questions and concerns the patient had during the visit today.  The CDE entered the data in this note under my direction and I personally reviewed it, signed any lab or medication orders that I instructed to be completed. I am the billing provider for this visit and the level of service was determined by my involvement in the Medical Decision Making Component of this visit while face to face with the patient.

## 2024-10-31 ENCOUNTER — APPOINTMENT (OUTPATIENT)
Dept: OBSTETRICS AND GYNECOLOGY | Facility: CLINIC | Age: 49
End: 2024-10-31
Payer: COMMERCIAL

## 2024-11-12 ENCOUNTER — APPOINTMENT (OUTPATIENT)
Dept: PRIMARY CARE | Facility: CLINIC | Age: 49
End: 2024-11-12
Payer: COMMERCIAL

## 2024-11-19 ENCOUNTER — APPOINTMENT (OUTPATIENT)
Dept: PRIMARY CARE | Facility: CLINIC | Age: 49
End: 2024-11-19
Payer: COMMERCIAL

## 2024-11-25 ENCOUNTER — APPOINTMENT (OUTPATIENT)
Dept: PRIMARY CARE | Facility: CLINIC | Age: 49
End: 2024-11-25
Payer: COMMERCIAL

## 2024-11-25 VITALS — SYSTOLIC BLOOD PRESSURE: 110 MMHG | DIASTOLIC BLOOD PRESSURE: 74 MMHG | WEIGHT: 204.8 LBS | BODY MASS INDEX: 31.14 KG/M2

## 2024-11-25 DIAGNOSIS — F43.29 STRESS AND ADJUSTMENT REACTION: Primary | ICD-10-CM

## 2024-11-25 PROCEDURE — 99214 OFFICE O/P EST MOD 30 MIN: CPT | Performed by: INTERNAL MEDICINE

## 2024-11-25 RX ORDER — ALPRAZOLAM 0.5 MG/1
0.5 TABLET ORAL DAILY PRN
Qty: 7 TABLET | Refills: 0 | Status: SHIPPED | OUTPATIENT
Start: 2024-11-25 | End: 2024-12-02

## 2024-11-25 ASSESSMENT — PATIENT HEALTH QUESTIONNAIRE - PHQ9
2. FEELING DOWN, DEPRESSED OR HOPELESS: NOT AT ALL
SUM OF ALL RESPONSES TO PHQ9 QUESTIONS 1 AND 2: 0
1. LITTLE INTEREST OR PLEASURE IN DOING THINGS: NOT AT ALL

## 2024-11-25 NOTE — PATIENT INSTRUCTIONS
I will look at the kidney and glucose function once the ccf surgery will check before your surgery

## 2024-11-25 NOTE — PROGRESS NOTES
Subjective   Patient ID: Orly Hernández is a 48 y.o. female who presents for Follow-up (Having surgery on 12/6. ).    HPI  Patient in for a visit  Sleeping better , if she wakes up she does a yoga   Weight loss not going well still a lot of stress , saw endo and nutritionist   And they recommended injectables she is relucatant to start these   Review of Systems    Previous history  Past Medical History:   Diagnosis Date    Abnormal weight gain 08/15/2022    Weight gain    Encounter for gynecological examination (general) (routine) without abnormal findings 08/31/2022    Visit for gynecologic examination    Encounter for other screening for malignant neoplasm of breast 08/16/2022    Breast screening    Encounter for screening for malignant neoplasm of colon 08/16/2022    Colon cancer screening    Hyperlipidemia, unspecified 08/16/2022    Hyperlipidemia    Nonscarring hair loss, unspecified 07/08/2022    Alopecia    Pain in right knee     Chronic pain of both knees    Sleep apnea, unspecified 07/08/2022    Sleep apnea    Sprain of anterior cruciate ligament of right knee, sequela     Rupture of anterior cruciate ligament of both knees, sequela    Unspecified fracture of left wrist and hand, initial encounter for closed fracture     Hand fracture, left    Unspecified skin changes 11/02/2022    Skin change     Past Surgical History:   Procedure Laterality Date    KNEE SURGERY  01/19/2017    Knee Surgery    OTHER SURGICAL HISTORY  08/16/2022    Abdominal surgery     Social History     Tobacco Use    Smoking status: Never    Smokeless tobacco: Never   Vaping Use    Vaping status: Never Used   Substance Use Topics    Alcohol use: Not Currently    Drug use: Never     No family history on file.  Allergies   Allergen Reactions    Sulfa (Sulfonamide Antibiotics) Rash     Current Outpatient Medications   Medication Instructions    albuterol 90 mcg/actuation inhaler 2 puffs, inhalation, Every 6 hours PRN    ALPRAZolam (XANAX) 0.5  mg, oral, 3 times daily PRN    azelastine (Astelin) 137 mcg (0.1 %) nasal spray 1 spray, Each Nostril, 2 times daily, Use in each nostril as directed    ciprofloxacin (Ciloxan) 0.3 % ophthalmic solution 1 drop, Every 2 hours    cyclobenzaprine (FLEXERIL) 5 mg, oral, Nightly, Do not take on same night as alprazolam    fluticasone (Flonase) 50 mcg/actuation nasal spray 2 sprays, Each Nostril, Daily, Shake gently. Before first use, prime pump. After use, clean tip and replace cap.    ibuprofen 600 mg tablet 1 tablet, 3 times daily (0900,1400,1900)    valACYclovir (Valtrex) 1 gram tablet 1 tablet    valACYclovir (Valtrex) 500 mg tablet 1 tablet, Daily (0630)       Objective       Physical Exam  Vital Signs: as recorded above  General: Well groomed, well nourished   Orientation:  Alert , oriented to time, place , and person   Mood and Affect:  Cooperative , no apparent distress normal affect  Skin: Good color, good turgor  Eyes: Extra ocular muscle movements intact, anicteric sclerae  Neck: Supple, full range of movement  Chest: Normal breath sounds, normal chest wall exam, symmetric, good air entry, clear to auscultation  Heart: Regular rate and rhythm, without murmur, gallop, or rubs  BACK:  no CTLS spine tenderness, no flank tenderness  Extremities: full range of movement  bilateral UE and bilateral LE,  no lower extremity edema  Neurological: Alert, oriented, cranial nerves II-XII grossly intact except for visual acuity  Sensation:  Intact   Gait: normal steady      Assessment/Plan   Orly Hernández is a 48 y.o. female who presents for the concerns below:    Problem List Items Addressed This Visit    None  Unable to loss weight currently   She reports having pcos  Will try metformin 500 mg and recheck her glucose and renal function   She would like not to start metformin until after the surgery,she is reluctant to start injectables will have her see them again   Recommended a weight loss spa to get the injectable    Cscope order is in since feb but she has not done it yet, askingif she should get it done now , will have her surgeon clear her for the colonoscopy during her ffup   Surgery is next month with dr Orly King,referred by her Gyne Dr Beltran repair of rectocele and bladder sling PAT done last Friday    She reports having pcos in [place] but the ultrasound in 2020 and in 2021 reports did not show polycystic ovaries, they may have a better look at the ultrasound       Stress had given her 7 days supply just before to help her sleep would take half a tablet   Discussed with:   Return in : 2-3 months until after fully healed from surgery     Portions of this note were generated using digital voice recognition software, and may contain grammatical errors       Anthony Vaughn MD  11/25/24  11:25 AM

## 2024-11-27 DIAGNOSIS — Z12.11 COLON CANCER SCREENING: ICD-10-CM

## 2024-11-28 RX ORDER — POLYETHYLENE GLYCOL 3350, SODIUM SULFATE ANHYDROUS, SODIUM BICARBONATE, SODIUM CHLORIDE, POTASSIUM CHLORIDE 236; 22.74; 6.74; 5.86; 2.97 G/4L; G/4L; G/4L; G/4L; G/4L
POWDER, FOR SOLUTION ORAL
Qty: 4000 ML | Refills: 0 | Status: SHIPPED | OUTPATIENT
Start: 2024-11-28

## 2025-01-17 ENCOUNTER — APPOINTMENT (OUTPATIENT)
Dept: ENDOCRINOLOGY | Facility: CLINIC | Age: 50
End: 2025-01-17
Payer: COMMERCIAL

## 2025-01-21 ENCOUNTER — APPOINTMENT (OUTPATIENT)
Dept: PRIMARY CARE | Facility: CLINIC | Age: 50
End: 2025-01-21
Payer: COMMERCIAL

## 2025-01-21 VITALS — BODY MASS INDEX: 31.78 KG/M2 | WEIGHT: 209 LBS | SYSTOLIC BLOOD PRESSURE: 105 MMHG | DIASTOLIC BLOOD PRESSURE: 65 MMHG

## 2025-01-21 DIAGNOSIS — R79.9 ABNORMAL BLOOD CHEMISTRY: ICD-10-CM

## 2025-01-21 DIAGNOSIS — F43.29 STRESS AND ADJUSTMENT REACTION: ICD-10-CM

## 2025-01-21 DIAGNOSIS — G47.33 OSA (OBSTRUCTIVE SLEEP APNEA): Primary | ICD-10-CM

## 2025-01-21 PROCEDURE — 1036F TOBACCO NON-USER: CPT | Performed by: INTERNAL MEDICINE

## 2025-01-21 PROCEDURE — 99214 OFFICE O/P EST MOD 30 MIN: CPT | Performed by: INTERNAL MEDICINE

## 2025-01-21 RX ORDER — ALPRAZOLAM 0.5 MG/1
0.5 TABLET ORAL DAILY PRN
Qty: 7 TABLET | Refills: 0 | Status: SHIPPED | OUTPATIENT
Start: 2025-01-21 | End: 2025-01-28

## 2025-01-21 ASSESSMENT — PATIENT HEALTH QUESTIONNAIRE - PHQ9
10. IF YOU CHECKED OFF ANY PROBLEMS, HOW DIFFICULT HAVE THESE PROBLEMS MADE IT FOR YOU TO DO YOUR WORK, TAKE CARE OF THINGS AT HOME, OR GET ALONG WITH OTHER PEOPLE: SOMEWHAT DIFFICULT
SUM OF ALL RESPONSES TO PHQ9 QUESTIONS 1 AND 2: 1
2. FEELING DOWN, DEPRESSED OR HOPELESS: NOT AT ALL
1. LITTLE INTEREST OR PLEASURE IN DOING THINGS: SEVERAL DAYS

## 2025-01-21 ASSESSMENT — ENCOUNTER SYMPTOMS
OCCASIONAL FEELINGS OF UNSTEADINESS: 0
LOSS OF SENSATION IN FEET: 0
DEPRESSION: 1

## 2025-01-21 NOTE — PROGRESS NOTES
Subjective   Patient ID: Orly Hernández is a 49 y.o. female who presents for FUV.    HPI  Patient in for a visit  Seeing surgeon tomorrow in follow-up did a good job  S/p bladder supsension sling     Her ffup with sleep medicine ,recommended weight loss     She got her cpap machine replaced from 2019 , so much better and she is sleeping better , still has some insomnia , she read study taking 1 tyl pm and 1 melatonin 10 mg , so she can sleep   Review of Systems  General: Denies fever, chills, night sweats,  Eyes: Negative for recent visual changes  Ears, Nose, Throat :  Negative for hearing changes, sinus discomfort  Dermatologic: Negative for new skin conditions, rash  Respiratory: Negative for wheezing, shortness of breath, cough  Cardiovascular: Negative for chest pain, palpitations, or leg swelling  Gastrointestinal: Negative for nausea/vomiting, abdominal pain, changes in bowel habits  Genitourinary Negative for Urinary Incontinence  urgency , frequency, discomfort   Musculoskeletal: see hpi  Neurological: Negative for headaches, dizziness    Previous history  Past Medical History:   Diagnosis Date    Abnormal weight gain 08/15/2022    Weight gain    Encounter for gynecological examination (general) (routine) without abnormal findings 08/31/2022    Visit for gynecologic examination    Encounter for other screening for malignant neoplasm of breast 08/16/2022    Breast screening    Encounter for screening for malignant neoplasm of colon 08/16/2022    Colon cancer screening    Hyperlipidemia, unspecified 08/16/2022    Hyperlipidemia    Nonscarring hair loss, unspecified 07/08/2022    Alopecia    Pain in right knee     Chronic pain of both knees    Sleep apnea, unspecified 07/08/2022    Sleep apnea    Sprain of anterior cruciate ligament of right knee, sequela     Rupture of anterior cruciate ligament of both knees, sequela    Unspecified fracture of left wrist and hand, initial encounter for closed fracture     Hand  fracture, left    Unspecified skin changes 11/02/2022    Skin change     Past Surgical History:   Procedure Laterality Date    KNEE SURGERY  01/19/2017    Knee Surgery    OTHER SURGICAL HISTORY  08/16/2022    Abdominal surgery     Social History     Tobacco Use    Smoking status: Never    Smokeless tobacco: Never   Vaping Use    Vaping status: Never Used   Substance Use Topics    Alcohol use: Not Currently    Drug use: Never     No family history on file.  Allergies   Allergen Reactions    Sulfa (Sulfonamide Antibiotics) Rash     Current Outpatient Medications   Medication Instructions    albuterol 90 mcg/actuation inhaler 2 puffs, inhalation, Every 6 hours PRN    ALPRAZolam (XANAX) 0.5 mg, oral, Daily PRN    azelastine (Astelin) 137 mcg (0.1 %) nasal spray 1 spray, Each Nostril, 2 times daily, Use in each nostril as directed    cyclobenzaprine (FLEXERIL) 5 mg, oral, Nightly, Do not take on same night as alprazolam    fluticasone (Flonase) 50 mcg/actuation nasal spray 2 sprays, Each Nostril, Daily, Shake gently. Before first use, prime pump. After use, clean tip and replace cap.    ibuprofen 600 mg tablet 1 tablet, 3 times daily (0900,1400,1900)    polyethylene glycol (GoLYTELY) 236-22.74-6.74 -5.86 gram solution Drink 1/2 starting at 6 pm the night before your procedure then drink the 2nd 1/2 5 hours before procedure arrival time    valACYclovir (Valtrex) 1 gram tablet 1 tablet    valACYclovir (Valtrex) 500 mg tablet 1 tablet, Daily (0630)       Objective       Physical Exam  Vital Signs: as recorded above  General: Well groomed, well nourished   Orientation:  Alert , oriented to time, place , and person   Mood and Affect:  Cooperative , no apparent distress normal affect  Skin: Good color, good turgor  Eyes: Extra ocular muscle movements intact, anicteric sclerae  Neck: Supple, full range of movement  Chest: Normal breath sounds, normal chest wall exam, symmetric, good air entry, clear to auscultation  Heart:  Regular rate and rhythm, without murmur, gallop, or rubs  BACK:  no CTLS spine tenderness, no flank tenderness  Extremities: full range of movement  bilateral UE and bilateral LE,  no lower extremity edema  Neurological: Alert, oriented, cranial nerves II-XII grossly intact except for visual acuity  Sensation:  Intact   Gait: normal steady      Assessment/Plan   Orly Hernández is a 49 y.o. female who presents for the concerns below:    Problem List Items Addressed This Visit    None  Serjio will start  no fam hx of thyroid cancer and pancreatitis, will start as this has been recently approved for SERJIO   Sleep disturbance , muscle spasm , she is to continue melatonin    OBESITY PLAN: Proper sleep habits, increase water intake, we'll make sure no metabolic problems weight reduction through a low calorie diet and  exercise preferred walking.  Benefits discussed with patient followup in one.  May consider consult with nutrition and endocrinology. If not done recently will recheck TSH, BMP    Anxiety for 6 court dates , will send alprazolam she has someone drive her , can take 1/2 - 1     Discussed with:   Return in : 2 months     Portions of this note were generated using digital voice recognition software, and may contain grammatical errors       Anthony Vaughn MD  01/21/25  3:30 PM

## 2025-02-03 ENCOUNTER — APPOINTMENT (OUTPATIENT)
Dept: GASTROENTEROLOGY | Facility: EXTERNAL LOCATION | Age: 50
End: 2025-02-03
Payer: COMMERCIAL

## 2025-02-28 ENCOUNTER — APPOINTMENT (OUTPATIENT)
Dept: ENDOCRINOLOGY | Facility: CLINIC | Age: 50
End: 2025-02-28
Payer: COMMERCIAL

## 2025-03-13 ENCOUNTER — APPOINTMENT (OUTPATIENT)
Dept: PRIMARY CARE | Facility: CLINIC | Age: 50
End: 2025-03-13
Payer: COMMERCIAL

## 2025-03-18 DIAGNOSIS — Z12.11 COLON CANCER SCREENING: ICD-10-CM

## 2025-03-24 RX ORDER — POLYETHYLENE GLYCOL 3350, SODIUM SULFATE ANHYDROUS, SODIUM BICARBONATE, SODIUM CHLORIDE, POTASSIUM CHLORIDE 236; 22.74; 6.74; 5.86; 2.97 G/4L; G/4L; G/4L; G/4L; G/4L
POWDER, FOR SOLUTION ORAL
Qty: 4000 ML | Refills: 0 | Status: SHIPPED | OUTPATIENT
Start: 2025-03-24

## 2025-03-27 ENCOUNTER — APPOINTMENT (OUTPATIENT)
Dept: GASTROENTEROLOGY | Facility: EXTERNAL LOCATION | Age: 50
End: 2025-03-27
Payer: COMMERCIAL

## 2025-04-01 ENCOUNTER — APPOINTMENT (OUTPATIENT)
Dept: PRIMARY CARE | Facility: CLINIC | Age: 50
End: 2025-04-01
Payer: COMMERCIAL

## 2025-04-01 VITALS
RESPIRATION RATE: 16 BRPM | DIASTOLIC BLOOD PRESSURE: 66 MMHG | SYSTOLIC BLOOD PRESSURE: 97 MMHG | HEART RATE: 68 BPM | HEIGHT: 68 IN | BODY MASS INDEX: 31.9 KG/M2 | WEIGHT: 210.5 LBS | OXYGEN SATURATION: 96 %

## 2025-04-01 DIAGNOSIS — G47.33 OSA (OBSTRUCTIVE SLEEP APNEA): Primary | ICD-10-CM

## 2025-04-01 DIAGNOSIS — F43.29 STRESS AND ADJUSTMENT REACTION: ICD-10-CM

## 2025-04-01 PROBLEM — M99.04 SOMATIC DYSFUNCTION OF SACRAL REGION: Status: ACTIVE | Noted: 2021-03-29

## 2025-04-01 PROBLEM — M79.10 MUSCLE PAIN: Status: ACTIVE | Noted: 2025-04-01

## 2025-04-01 PROBLEM — S69.92XA HAND INJURY, LEFT, INITIAL ENCOUNTER: Status: ACTIVE | Noted: 2025-04-01

## 2025-04-01 PROBLEM — M99.02 THORACIC SEGMENT DYSFUNCTION: Status: ACTIVE | Noted: 2021-03-29

## 2025-04-01 PROBLEM — M79.645 PAIN OF FINGER OF LEFT HAND: Status: ACTIVE | Noted: 2025-04-01

## 2025-04-01 PROBLEM — R51.9 HEADACHE: Status: ACTIVE | Noted: 2025-04-01

## 2025-04-01 PROBLEM — L65.9 ALOPECIA: Status: ACTIVE | Noted: 2025-04-01

## 2025-04-01 PROBLEM — M25.642 STIFFNESS OF FINGER JOINT OF LEFT HAND: Status: ACTIVE | Noted: 2025-04-01

## 2025-04-01 PROBLEM — N39.41 URGE INCONTINENCE: Status: ACTIVE | Noted: 2021-10-12

## 2025-04-01 PROBLEM — N90.4 VULVAR DYSTROPHY: Status: ACTIVE | Noted: 2024-09-16

## 2025-04-01 PROBLEM — S62.309A FRACTURE, METACARPAL: Status: ACTIVE | Noted: 2025-04-01

## 2025-04-01 PROBLEM — S83.519A RUPTURE OF ANTERIOR CRUCIATE LIGAMENT OF KNEE: Status: ACTIVE | Noted: 2025-04-01

## 2025-04-01 PROBLEM — R15.9 INCONTINENCE OF FECES WITH FECAL URGENCY: Status: ACTIVE | Noted: 2024-09-16

## 2025-04-01 PROBLEM — D64.9 ANEMIA: Status: ACTIVE | Noted: 2025-04-01

## 2025-04-01 PROBLEM — M99.01 CERVICAL SEGMENT DYSFUNCTION: Status: ACTIVE | Noted: 2021-05-05

## 2025-04-01 PROBLEM — N39.3 SUI (STRESS URINARY INCONTINENCE, FEMALE): Status: ACTIVE | Noted: 2021-10-12

## 2025-04-01 PROBLEM — M95.5 PELVIC OBLIQUITY: Status: ACTIVE | Noted: 2021-03-29

## 2025-04-01 PROBLEM — M99.03 SOMATIC DYSFUNCTION OF LUMBAR REGION: Status: ACTIVE | Noted: 2021-03-29

## 2025-04-01 PROBLEM — M99.05 SOMATIC DYSFUNCTION OF PELVIS REGION: Status: ACTIVE | Noted: 2021-03-29

## 2025-04-01 PROBLEM — N90.89 VULVAR LESION: Status: ACTIVE | Noted: 2024-10-30

## 2025-04-01 PROBLEM — M23.90 DERANGEMENT OF KNEE: Status: ACTIVE | Noted: 2025-04-01

## 2025-04-01 PROBLEM — T84.84XA PAINFUL ORTHOPAEDIC HARDWARE: Status: ACTIVE | Noted: 2025-04-01

## 2025-04-01 PROBLEM — R15.2 INCONTINENCE OF FECES WITH FECAL URGENCY: Status: ACTIVE | Noted: 2024-09-16

## 2025-04-01 PROBLEM — N90.89 PERINEAL CYST IN FEMALE: Status: ACTIVE | Noted: 2024-09-16

## 2025-04-01 PROBLEM — R23.9 SKIN CHANGE: Status: ACTIVE | Noted: 2025-04-01

## 2025-04-01 PROBLEM — M99.08 RIB CAGE REGION SOMATIC DYSFUNCTION: Status: ACTIVE | Noted: 2021-03-29

## 2025-04-01 PROBLEM — M99.06 SOMATIC DYSFUNCTION OF LOWER EXTREMITIES: Status: ACTIVE | Noted: 2021-06-02

## 2025-04-01 PROBLEM — E78.5 HYPERLIPIDEMIA: Status: ACTIVE | Noted: 2025-04-01

## 2025-04-01 PROBLEM — M25.561 BILATERAL KNEE PAIN: Status: ACTIVE | Noted: 2025-04-01
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
## 2025-04-01 PROBLEM — G47.30 SLEEP APNEA: Status: ACTIVE | Noted: 2025-04-01

## 2025-04-01 PROBLEM — M54.16 LUMBAR RADICULOPATHY: Status: ACTIVE | Noted: 2021-08-11

## 2025-04-01 PROBLEM — M99.07 SOMATIC DYSFUNCTION OF UPPER EXTREMITIES: Status: ACTIVE | Noted: 2021-05-05

## 2025-04-01 PROBLEM — M25.562 BILATERAL KNEE PAIN: Status: ACTIVE | Noted: 2025-04-01

## 2025-04-01 PROBLEM — R10.9 ABDOMINAL PAIN: Status: ACTIVE | Noted: 2025-04-01

## 2025-04-01 PROBLEM — R63.5 WEIGHT GAIN: Status: ACTIVE | Noted: 2025-04-01

## 2025-04-01 PROBLEM — M23.8X2 ACL LAXITY, LEFT: Status: ACTIVE | Noted: 2025-04-01

## 2025-04-01 PROBLEM — R15.1 FECAL SMEARING: Status: ACTIVE | Noted: 2024-05-16

## 2025-04-01 PROBLEM — N81.6 RECTOCELE: Status: ACTIVE | Noted: 2024-09-16

## 2025-04-01 PROBLEM — R39.15 URGENCY OF URINATION: Status: ACTIVE | Noted: 2021-10-12

## 2025-04-01 PROCEDURE — 99214 OFFICE O/P EST MOD 30 MIN: CPT | Performed by: INTERNAL MEDICINE

## 2025-04-01 PROCEDURE — 3008F BODY MASS INDEX DOCD: CPT | Performed by: INTERNAL MEDICINE

## 2025-04-01 PROCEDURE — 1036F TOBACCO NON-USER: CPT | Performed by: INTERNAL MEDICINE

## 2025-04-01 RX ORDER — DULAGLUTIDE 0.75 MG/.5ML
0.75 INJECTION, SOLUTION SUBCUTANEOUS
COMMUNITY

## 2025-04-01 ASSESSMENT — LIFESTYLE VARIABLES
HOW MANY STANDARD DRINKS CONTAINING ALCOHOL DO YOU HAVE ON A TYPICAL DAY: 1 OR 2
HOW OFTEN DO YOU HAVE A DRINK CONTAINING ALCOHOL: 2-4 TIMES A MONTH

## 2025-04-01 NOTE — PROGRESS NOTES
Subjective   Patient ID: Orly Hernández is a 49 y.o. female who presents for Follow-up.    HPI  Patient in for a visit  She gained weight during there 7 weeks of court hearings  Then she has lost some started the medication after the hearings   She had seen the weight loss mgt at [facility] the first thing was not covered   Her sleep doctor also said the same thing since pt has ronaldo  The weight loss specialist said ti would take a while , trulicity was covered she had certain indicators for it . She does not feel as hungry so it does help seeing them again she is at the starting dose   Trying to do her swimming and robust hiking or walking     Review of Systems  General: Denies fever, chills, night sweats,  Eyes: Negative for recent visual changes  Ears, Nose, Throat :  Negative for hearing changes, sinus discomfort  Dermatologic: Negative for new skin conditions, rash  Respiratory: Negative for wheezing, shortness of breath, cough  Cardiovascular: Negative for chest pain, palpitations, or leg swelling  Gastrointestinal: Negative for nausea/vomiting, abdominal pain, changes in bowel habits  Genitourinary Negative for Urinary Incontinence  urgency , frequency, discomfort   Musculoskeletal: see hpi  Neurological: Negative for headaches, dizziness    Previous history  Past Medical History:   Diagnosis Date    Abnormal weight gain 08/15/2022    Weight gain    Encounter for gynecological examination (general) (routine) without abnormal findings 08/31/2022    Visit for gynecologic examination    Encounter for other screening for malignant neoplasm of breast 08/16/2022    Breast screening    Encounter for screening for malignant neoplasm of colon 08/16/2022    Colon cancer screening    Hyperlipidemia, unspecified 08/16/2022    Hyperlipidemia    Nonscarring hair loss, unspecified 07/08/2022    Alopecia    Pain in right knee     Chronic pain of both knees    Sleep apnea, unspecified 07/08/2022    Sleep apnea    Sprain of anterior  cruciate ligament of right knee, sequela     Rupture of anterior cruciate ligament of both knees, sequela    Unspecified fracture of left wrist and hand, initial encounter for closed fracture     Hand fracture, left    Unspecified skin changes 11/02/2022    Skin change     Past Surgical History:   Procedure Laterality Date    BLADDER SUSPENSION  12/06/2024    SUSPENSION BLADDER SLING, POSTERIOR COLPORRHAPHY W/ REPAIR RECTOCELE AND PERINEORRHAPHY, VULVECTOMY COMPLETE SIMPLE.    KNEE SURGERY  01/19/2017    Knee Surgery    OTHER SURGICAL HISTORY  08/16/2022    Abdominal surgery     Social History     Tobacco Use    Smoking status: Never     Passive exposure: Never    Smokeless tobacco: Never   Vaping Use    Vaping status: Never Used   Substance Use Topics    Alcohol use: Not Currently    Drug use: Never     No family history on file.  Allergies   Allergen Reactions    Sulfa (Sulfonamide Antibiotics) Rash     Current Outpatient Medications   Medication Instructions    albuterol 90 mcg/actuation inhaler 2 puffs, inhalation, Every 6 hours PRN    ALPRAZolam (XANAX) 0.5 mg, oral, Daily PRN    azelastine (Astelin) 137 mcg (0.1 %) nasal spray 1 spray, Each Nostril, 2 times daily, Use in each nostril as directed    cyclobenzaprine (FLEXERIL) 5 mg, oral, Nightly, Do not take on same night as alprazolam    fluticasone (Flonase) 50 mcg/actuation nasal spray 2 sprays, Each Nostril, Daily, Shake gently. Before first use, prime pump. After use, clean tip and replace cap.    ibuprofen 600 mg tablet 1 tablet, 3 times daily (0900,1400,1900)    polyethylene glycol (GoLYTELY) 236-22.74-6.74 -5.86 gram solution Drink 1/2 starting at 6 pm the night before your procedure then drink the 2nd 1/2 5 hours before procedure arrival time    tirzepatide (weight loss) (ZEPBOUND) 2.5 mg, subcutaneous, Every 7 days    Trulicity 0.75 mg, subcutaneous, Once Weekly    valACYclovir (Valtrex) 1 gram tablet 1 tablet    valACYclovir (Valtrex) 500 mg tablet 1  tablet, Daily (0630)       Objective       Physical Exam  Vital Signs: as recorded above  General: Well groomed, well nourished   Orientation:  Alert , oriented to time, place , and person   Mood and Affect:  Cooperative , no apparent distress normal affect  Skin: Good color, good turgor  Eyes: Extra ocular muscle movements intact, anicteric sclerae  Neck: Supple, full range of movement  Chest: Normal breath sounds, normal chest wall exam, symmetric, good air entry, clear to auscultation  Heart: Regular rate and rhythm, without murmur, gallop, or rubs  BACK:  no CTLS spine tenderness, no flank tenderness  Extremities: full range of movement  bilateral UE and bilateral LE,  no lower extremity edema  Neurological: Alert, oriented, cranial nerves II-XII grossly intact except for visual acuity  Sensation:  Intact   Gait: normal steady      Assessment/Plan   Orly Hernández is a 49 y.o. female who presents for the concerns below:    Problem List Items Addressed This Visit    None    OBESITY PLAN: Proper sleep habits, increase water intake, the trulicity that the weight loss program prescribes is effective but may have plateau now seeing them this month  Exercise and weight training for both weight and stress   STRESS MANAGEMENT:PLAN:  Patient symptoms stable with current medications. Rest/sleep hygiene, exercise for stress relief  No thoughts of hurting self or others.  She does not need until June July and then October      Discussed with:   Return in : June     Portions of this note were generated using digital voice recognition software, and may contain grammatical errors       Anthony Vaughn MD  04/01/25  2:41 PM

## 2025-04-17 ENCOUNTER — APPOINTMENT (OUTPATIENT)
Dept: GASTROENTEROLOGY | Facility: EXTERNAL LOCATION | Age: 50
End: 2025-04-17
Payer: COMMERCIAL

## 2025-04-17 ENCOUNTER — TELEPHONE (OUTPATIENT)
Dept: ENDOCRINOLOGY | Facility: CLINIC | Age: 50
End: 2025-04-17

## 2025-04-17 NOTE — TELEPHONE ENCOUNTER
Called and left message for Orly with our new office location (address, phone, fax). Made her aware that  will still be in network. In addition, if she receives any patient assistance / medical supplies, she will need to contact them with this updated information.

## 2025-05-05 ENCOUNTER — APPOINTMENT (OUTPATIENT)
Dept: GASTROENTEROLOGY | Facility: EXTERNAL LOCATION | Age: 50
End: 2025-05-05
Payer: COMMERCIAL

## 2025-05-19 DIAGNOSIS — Z12.11 ENCOUNTER FOR SCREENING FOR MALIGNANT NEOPLASM OF COLON: ICD-10-CM

## 2025-05-19 RX ORDER — SODIUM, POTASSIUM,MAG SULFATES 17.5-3.13G
SOLUTION, RECONSTITUTED, ORAL ORAL
Qty: 1 EACH | Refills: 0 | Status: SHIPPED | OUTPATIENT
Start: 2025-05-19

## 2025-06-03 ENCOUNTER — APPOINTMENT (OUTPATIENT)
Dept: PRIMARY CARE | Facility: CLINIC | Age: 50
End: 2025-06-03
Payer: COMMERCIAL

## 2025-06-09 ENCOUNTER — APPOINTMENT (OUTPATIENT)
Dept: GASTROENTEROLOGY | Facility: EXTERNAL LOCATION | Age: 50
End: 2025-06-09
Payer: COMMERCIAL

## 2025-06-13 PROCEDURE — 0753T DGTZ GLS MCRSCP SLD LEVEL IV: CPT

## 2025-06-13 PROCEDURE — 88305 TISSUE EXAM BY PATHOLOGIST: CPT

## 2025-06-13 PROCEDURE — 88305 TISSUE EXAM BY PATHOLOGIST: CPT | Performed by: PATHOLOGY

## 2025-06-16 ENCOUNTER — APPOINTMENT (OUTPATIENT)
Dept: ENDOCRINOLOGY | Facility: CLINIC | Age: 50
End: 2025-06-16
Payer: COMMERCIAL

## 2025-06-16 ENCOUNTER — APPOINTMENT (OUTPATIENT)
Dept: GASTROENTEROLOGY | Facility: EXTERNAL LOCATION | Age: 50
End: 2025-06-16
Payer: COMMERCIAL

## 2025-06-16 DIAGNOSIS — Z12.11 ENCOUNTER FOR SCREENING FOR MALIGNANT NEOPLASM OF COLON: Primary | ICD-10-CM

## 2025-06-16 DIAGNOSIS — D12.2 BENIGN NEOPLASM OF ASCENDING COLON: ICD-10-CM

## 2025-06-16 DIAGNOSIS — D12.0 BENIGN NEOPLASM OF CECUM: ICD-10-CM

## 2025-06-16 PROCEDURE — 45385 COLONOSCOPY W/LESION REMOVAL: CPT | Performed by: INTERNAL MEDICINE

## 2025-06-17 ENCOUNTER — LAB REQUISITION (OUTPATIENT)
Dept: LAB | Facility: HOSPITAL | Age: 50
End: 2025-06-17
Payer: COMMERCIAL

## 2025-06-17 DIAGNOSIS — Z12.11 ENCOUNTER FOR SCREENING FOR MALIGNANT NEOPLASM OF COLON: ICD-10-CM

## 2025-06-25 LAB
LABORATORY COMMENT REPORT: NORMAL
PATH REPORT.FINAL DX SPEC: NORMAL
PATH REPORT.GROSS SPEC: NORMAL
PATH REPORT.RELEVANT HX SPEC: NORMAL
PATH REPORT.TOTAL CANCER: NORMAL

## 2025-06-26 ENCOUNTER — APPOINTMENT (OUTPATIENT)
Dept: PRIMARY CARE | Facility: CLINIC | Age: 50
End: 2025-06-26
Payer: COMMERCIAL

## 2025-07-23 ENCOUNTER — APPOINTMENT (OUTPATIENT)
Dept: PRIMARY CARE | Facility: CLINIC | Age: 50
End: 2025-07-23
Payer: COMMERCIAL

## 2025-07-31 ENCOUNTER — APPOINTMENT (OUTPATIENT)
Dept: PRIMARY CARE | Facility: CLINIC | Age: 50
End: 2025-07-31
Payer: COMMERCIAL

## 2025-09-04 ENCOUNTER — APPOINTMENT (OUTPATIENT)
Dept: PRIMARY CARE | Facility: CLINIC | Age: 50
End: 2025-09-04
Payer: COMMERCIAL

## 2025-09-04 LAB
ALBUMIN SERPL-MCNC: 3.8 G/DL (ref 3.6–5.1)
ALP SERPL-CCNC: 52 U/L (ref 37–153)
ALT SERPL-CCNC: 14 U/L (ref 6–29)
ANION GAP SERPL CALCULATED.4IONS-SCNC: 7 MMOL/L (CALC) (ref 7–17)
AST SERPL-CCNC: 15 U/L (ref 10–35)
BILIRUB SERPL-MCNC: 0.8 MG/DL (ref 0.2–1.2)
BUN SERPL-MCNC: 11 MG/DL (ref 7–25)
CALCIUM SERPL-MCNC: 9.4 MG/DL (ref 8.6–10.4)
CHLORIDE SERPL-SCNC: 106 MMOL/L (ref 98–110)
CO2 SERPL-SCNC: 25 MMOL/L (ref 20–32)
CREAT SERPL-MCNC: 0.61 MG/DL (ref 0.5–1.03)
EGFRCR SERPLBLD CKD-EPI 2021: 108 ML/MIN/1.73M2
EST. AVERAGE GLUCOSE BLD GHB EST-MCNC: 105 MG/DL
EST. AVERAGE GLUCOSE BLD GHB EST-SCNC: 5.8 MMOL/L
GLUCOSE SERPL-MCNC: 87 MG/DL (ref 65–99)
HBA1C MFR BLD: 5.3 %
POTASSIUM SERPL-SCNC: 4.1 MMOL/L (ref 3.5–5.3)
PROT SERPL-MCNC: 6.2 G/DL (ref 6.1–8.1)
SODIUM SERPL-SCNC: 138 MMOL/L (ref 135–146)

## 2025-09-12 ENCOUNTER — APPOINTMENT (OUTPATIENT)
Facility: CLINIC | Age: 50
End: 2025-09-12
Payer: COMMERCIAL

## 2025-10-08 ENCOUNTER — APPOINTMENT (OUTPATIENT)
Dept: OBSTETRICS AND GYNECOLOGY | Facility: CLINIC | Age: 50
End: 2025-10-08
Payer: COMMERCIAL

## 2025-12-01 ENCOUNTER — APPOINTMENT (OUTPATIENT)
Facility: CLINIC | Age: 50
End: 2025-12-01
Payer: COMMERCIAL

## 2026-01-05 ENCOUNTER — APPOINTMENT (OUTPATIENT)
Dept: PRIMARY CARE | Facility: CLINIC | Age: 51
End: 2026-01-05
Payer: COMMERCIAL